# Patient Record
Sex: MALE | Race: WHITE | NOT HISPANIC OR LATINO | Employment: OTHER | URBAN - METROPOLITAN AREA
[De-identification: names, ages, dates, MRNs, and addresses within clinical notes are randomized per-mention and may not be internally consistent; named-entity substitution may affect disease eponyms.]

---

## 2023-08-04 LAB
AMPHETAMINE (PRESENCE) IN URINE BY SCREEN METHOD: ABNORMAL
BARBITURATES PRESENCE IN URINE BY SCREEN METHOD: ABNORMAL
BENZODIAZEPINE (PRESENCE) IN URINE BY SCREEN METHOD: ABNORMAL
CANNABINOIDS IN URINE BY SCREEN METHOD: ABNORMAL
COCAINE (PRESENCE) IN URINE BY SCREEN METHOD: ABNORMAL
DRUG SCREEN COMMENT URINE: ABNORMAL
FENTANYL URINE: ABNORMAL
METHADONE (PRESENCE) IN URINE BY SCREEN METHOD: ABNORMAL
OPIATES (PRESENCE) IN URINE BY SCREEN METHOD: ABNORMAL
OXYCODONE (PRESENCE) IN URINE BY SCREEN METHOD: ABNORMAL
PHENCYCLIDINE (PRESENCE) IN URINE BY SCREEN METHOD: ABNORMAL

## 2023-08-09 LAB — 11-NOR-9-CARBOXY-THC, URN, QUANT: 43 NG/ML

## 2023-08-10 LAB
7-AMINOCLONAZEPAM: <25 NG/ML
ALPHA-HYDROXYALPRAZOLAM: >1000 NG/ML
ALPHA-HYDROXYMIDAZOLAM: <25 NG/ML
ALPRAZOLAM: 418 NG/ML
CHLORDIAZEPOXIDE: <25 NG/ML
CLONAZEPAM: <25 NG/ML
DIAZEPAM: <25 NG/ML
LORAZEPAM: <25 NG/ML
MIDAZOLAM: <25 NG/ML
NORDIAZEPAM: <25 NG/ML
OXAZEPAM: <25 NG/ML
TEMAZEPAM: <25 NG/ML

## 2023-08-16 LAB — URINE CULTURE: NO GROWTH

## 2023-11-15 DIAGNOSIS — I10 HYPERTENSION, UNSPECIFIED TYPE: ICD-10-CM

## 2023-11-15 NOTE — TELEPHONE ENCOUNTER
Rx Refill Request Telephone Encounter    Name:  Soren Herrera  :  [id number]  Medication Name:  LISINOPRIL 10 MG  Specific Pharmacy location:  [address] [phone]   Date of last appointment:  8/15/23 ([facility]IA)  Date of next appointment:  24  Best number to reach patient:   [phone]

## 2023-11-20 RX ORDER — LISINOPRIL AND HYDROCHLOROTHIAZIDE 10; 12.5 MG/1; MG/1
1 TABLET ORAL DAILY
COMMUNITY
End: 2023-11-20 | Stop reason: SDUPTHER

## 2023-11-20 RX ORDER — LISINOPRIL AND HYDROCHLOROTHIAZIDE 10; 12.5 MG/1; MG/1
1 TABLET ORAL DAILY
Qty: 90 TABLET | Refills: 0 | Status: SHIPPED | OUTPATIENT
Start: 2023-11-20 | End: 2024-01-03 | Stop reason: SDUPTHER

## 2023-12-19 ENCOUNTER — CLINICAL SUPPORT (OUTPATIENT)
Dept: PRIMARY CARE | Facility: CLINIC | Age: 78
End: 2023-12-19
Payer: MEDICARE

## 2023-12-19 DIAGNOSIS — Z23 NEED FOR INFLUENZA VACCINATION: ICD-10-CM

## 2023-12-19 PROCEDURE — 90662 IIV NO PRSV INCREASED AG IM: CPT | Performed by: FAMILY MEDICINE

## 2023-12-19 PROCEDURE — G0008 ADMIN INFLUENZA VIRUS VAC: HCPCS | Performed by: FAMILY MEDICINE

## 2024-01-03 ENCOUNTER — OFFICE VISIT (OUTPATIENT)
Dept: PRIMARY CARE | Facility: CLINIC | Age: 79
End: 2024-01-03
Payer: MEDICARE

## 2024-01-03 VITALS
BODY MASS INDEX: 25.77 KG/M2 | DIASTOLIC BLOOD PRESSURE: 78 MMHG | HEIGHT: 69 IN | SYSTOLIC BLOOD PRESSURE: 113 MMHG | WEIGHT: 174 LBS | TEMPERATURE: 97.3 F | HEART RATE: 93 BPM

## 2024-01-03 DIAGNOSIS — G47.00 INSOMNIA, UNSPECIFIED TYPE: ICD-10-CM

## 2024-01-03 DIAGNOSIS — L85.3 DRY SKIN DERMATITIS: ICD-10-CM

## 2024-01-03 DIAGNOSIS — Z00.00 WELL ADULT HEALTH CHECK: ICD-10-CM

## 2024-01-03 DIAGNOSIS — I10 HYPERTENSION, UNSPECIFIED TYPE: ICD-10-CM

## 2024-01-03 DIAGNOSIS — I70.90 ATHEROSCLEROSIS: ICD-10-CM

## 2024-01-03 DIAGNOSIS — E78.5 HYPERLIPIDEMIA, UNSPECIFIED HYPERLIPIDEMIA TYPE: ICD-10-CM

## 2024-01-03 DIAGNOSIS — I10 BENIGN ESSENTIAL HTN: Primary | Chronic | ICD-10-CM

## 2024-01-03 PROBLEM — F40.243 ANXIETY WITH FLYING: Status: ACTIVE | Noted: 2024-01-03

## 2024-01-03 PROBLEM — H25.813 COMBINED FORM OF SENILE CATARACT OF BOTH EYES: Status: RESOLVED | Noted: 2024-01-03 | Resolved: 2024-01-03

## 2024-01-03 PROBLEM — I49.3 PVC (PREMATURE VENTRICULAR CONTRACTION): Status: RESOLVED | Noted: 2024-01-03 | Resolved: 2024-01-03

## 2024-01-03 PROBLEM — Z96.649 HISTORY OF TOTAL HIP REPLACEMENT: Status: RESOLVED | Noted: 2024-01-03 | Resolved: 2024-01-03

## 2024-01-03 PROCEDURE — 3074F SYST BP LT 130 MM HG: CPT | Performed by: FAMILY MEDICINE

## 2024-01-03 PROCEDURE — 1036F TOBACCO NON-USER: CPT | Performed by: FAMILY MEDICINE

## 2024-01-03 PROCEDURE — 1159F MED LIST DOCD IN RCRD: CPT | Performed by: FAMILY MEDICINE

## 2024-01-03 PROCEDURE — 3078F DIAST BP <80 MM HG: CPT | Performed by: FAMILY MEDICINE

## 2024-01-03 PROCEDURE — 1160F RVW MEDS BY RX/DR IN RCRD: CPT | Performed by: FAMILY MEDICINE

## 2024-01-03 PROCEDURE — 99214 OFFICE O/P EST MOD 30 MIN: CPT | Performed by: FAMILY MEDICINE

## 2024-01-03 RX ORDER — ASPIRIN 81 MG/1
81 TABLET ORAL DAILY
COMMUNITY
Start: 2014-05-19 | End: 2024-03-04 | Stop reason: ALTCHOICE

## 2024-01-03 RX ORDER — CELECOXIB 200 MG/1
200 CAPSULE ORAL DAILY PRN
COMMUNITY
Start: 2023-08-05 | End: 2024-01-03 | Stop reason: ALTCHOICE

## 2024-01-03 RX ORDER — UBIDECARENONE 30 MG
1 CAPSULE ORAL DAILY
COMMUNITY

## 2024-01-03 RX ORDER — RAMELTEON 8 MG/1
8 TABLET ORAL NIGHTLY
Qty: 90 TABLET | Refills: 0 | Status: SHIPPED | OUTPATIENT
Start: 2024-01-03 | End: 2024-01-08

## 2024-01-03 RX ORDER — LISINOPRIL AND HYDROCHLOROTHIAZIDE 10; 12.5 MG/1; MG/1
1 TABLET ORAL DAILY
Qty: 90 TABLET | Refills: 1 | Status: SHIPPED | OUTPATIENT
Start: 2024-01-03 | End: 2024-03-04 | Stop reason: SDUPTHER

## 2024-01-03 RX ORDER — CHOLECALCIFEROL (VITAMIN D3) 50 MCG
1 TABLET ORAL DAILY
COMMUNITY
End: 2024-03-04 | Stop reason: ALTCHOICE

## 2024-01-03 RX ORDER — ROSUVASTATIN CALCIUM 10 MG/1
10 TABLET, COATED ORAL DAILY
COMMUNITY
Start: 2023-01-30 | End: 2024-01-03 | Stop reason: SDUPTHER

## 2024-01-03 RX ORDER — ROSUVASTATIN CALCIUM 10 MG/1
10 TABLET, COATED ORAL DAILY
Qty: 90 TABLET | Refills: 1 | Status: SHIPPED | OUTPATIENT
Start: 2024-01-03 | End: 2024-07-01

## 2024-01-03 RX ORDER — CLOBETASOL PROPIONATE 0.46 MG/ML
SOLUTION TOPICAL
Qty: 60 ML | Refills: 1 | Status: SHIPPED | OUTPATIENT
Start: 2024-01-03 | End: 2024-03-04 | Stop reason: WASHOUT

## 2024-01-03 ASSESSMENT — PATIENT HEALTH QUESTIONNAIRE - PHQ9
2. FEELING DOWN, DEPRESSED OR HOPELESS: NOT AT ALL
1. LITTLE INTEREST OR PLEASURE IN DOING THINGS: NOT AT ALL
SUM OF ALL RESPONSES TO PHQ9 QUESTIONS 1 AND 2: 0

## 2024-01-06 ASSESSMENT — ENCOUNTER SYMPTOMS
PALPITATIONS: 0
VOMITING: 0
DIARRHEA: 0
FATIGUE: 1
HEADACHES: 0
UNEXPECTED WEIGHT CHANGE: 0
CHEST TIGHTNESS: 0
CONSTIPATION: 0
SHORTNESS OF BREATH: 0
ABDOMINAL PAIN: 0
CHILLS: 0
NAUSEA: 0
APPETITE CHANGE: 0
FEVER: 0
DIZZINESS: 0
COUGH: 0

## 2024-01-06 NOTE — PROGRESS NOTES
"Subjective   Patient ID: Soren Herrera is a 78 y.o. male who presents for Follow-up (Patient wants to discuss a topical cream for a rash. ).    HPI   Here for follow-up  1.  Hypertension-stable on lisinopril-HCTZ.  Infrequent home measurements.  Denies headaches, lightheadedness, dizziness  2.  Hyperlipidemia-stable on statin therapy.  Denies chest pains, palpitations, dyspnea on exertion  3.  Skin dermatitis-severe itching neck and upper back, flares typically each winter.  Patient feels scaly lesions and then picks and scratches at lesion.  Previous good response to clobetasol mixed with CeraVe.  4.  Insomnia-continues to have chronic issues with Sleep onset and maintenance.  Frequent wakening.  Past use of benzodiazepines to achieve sleep.   Review of Systems   Constitutional:  Positive for fatigue. Negative for appetite change, chills, fever and unexpected weight change.   Eyes:  Negative for visual disturbance.   Respiratory:  Negative for cough, chest tightness and shortness of breath.    Cardiovascular:  Negative for chest pain and palpitations.   Gastrointestinal:  Negative for abdominal pain, constipation, diarrhea, nausea and vomiting.   Neurological:  Negative for dizziness and headaches.       Objective   /78 (BP Location: Left arm, Patient Position: Sitting)   Pulse 93   Temp 36.3 °C (97.3 °F)   Ht 1.753 m (5' 9\")   Wt 78.9 kg (174 lb)   BMI 25.70 kg/m²     Physical Exam  Vitals reviewed.   Constitutional:       General: He is not in acute distress.     Appearance: Normal appearance. He is normal weight.   HENT:      Right Ear: Tympanic membrane and ear canal normal.      Left Ear: Tympanic membrane and ear canal normal.      Nose: Nose normal. No congestion or rhinorrhea.      Mouth/Throat:      Mouth: Mucous membranes are moist.      Pharynx: Oropharynx is clear.   Eyes:      Extraocular Movements: Extraocular movements intact.      Conjunctiva/sclera: Conjunctivae normal.      Pupils: " Pupils are equal, round, and reactive to light.   Cardiovascular:      Rate and Rhythm: Normal rate and regular rhythm.      Pulses: Normal pulses.      Heart sounds: Normal heart sounds. No murmur heard.  Pulmonary:      Effort: Pulmonary effort is normal. No respiratory distress.      Breath sounds: Normal breath sounds.   Abdominal:      General: Abdomen is flat. Bowel sounds are normal.      Palpations: Abdomen is soft.      Tenderness: There is no abdominal tenderness.   Musculoskeletal:         General: Normal range of motion.      Cervical back: Normal range of motion and neck supple.   Lymphadenopathy:      Cervical: No cervical adenopathy.   Skin:     General: Skin is warm and dry.      Findings: Rash present.      Comments: Mild erythema with scaling across upper back, evidence of excoriation and well-healed scars consistent with traumatic excoriation in the past.   Neurological:      General: No focal deficit present.      Mental Status: He is alert and oriented to person, place, and time.   Psychiatric:         Mood and Affect: Mood normal.         Thought Content: Thought content normal.         Assessment/Plan   Diagnoses and all orders for this visit:  Benign essential HTN  -     lisinopriL-hydrochlorothiazide 10-12.5 mg tablet; Take 1 tablet by mouth once daily.  -     CBC and Auto Differential; Future  -     Comprehensive Metabolic Panel; Future  -     Albumin , Urine Random; Future  Hypertension, unspecified type  Atherosclerosis  -     rosuvastatin (Crestor) 10 mg tablet; Take 1 tablet (10 mg) by mouth once daily.  -     Lipid Panel; Future  Hyperlipidemia, unspecified hyperlipidemia type  -     rosuvastatin (Crestor) 10 mg tablet; Take 1 tablet (10 mg) by mouth once daily.  -     Lipid Panel; Future  -     TSH with reflex to Free T4 if abnormal; Future  Dry skin dermatitis  -     clobetasol (Temovate) 0.05 % external solution; Mix contents in large tub of cerave and apply bid  Well adult health  check  -     Comprehensive Metabolic Panel; Future  Insomnia, unspecified type  Trial of Rozerem to help with chronic insomnia.-     ramelteon (Rozerem) 8 mg tablet; Take 1 tablet (8 mg) by mouth once daily at bedtime.  Other orders  -     Follow Up In Primary Care - Established; Future

## 2024-01-08 ENCOUNTER — TELEPHONE (OUTPATIENT)
Dept: PRIMARY CARE | Facility: CLINIC | Age: 79
End: 2024-01-08

## 2024-01-08 DIAGNOSIS — G47.00 INSOMNIA, UNSPECIFIED TYPE: Primary | ICD-10-CM

## 2024-01-08 RX ORDER — DOXEPIN HYDROCHLORIDE 10 MG/ML
3 SOLUTION ORAL NIGHTLY
Qty: 118 ML | Refills: 0 | Status: SHIPPED | OUTPATIENT
Start: 2024-01-08 | End: 2024-02-13 | Stop reason: SDUPTHER

## 2024-01-08 NOTE — TELEPHONE ENCOUNTER
Patient stated that the sleep medication that was prescribed at [facility] is $300 and would like to know if there is an alternate that can be given that is not expensive.  Please advise.

## 2024-01-08 NOTE — TELEPHONE ENCOUNTER
He can try a small dose of doxepin.  However, schedule I month follow-up for reassessment.  Call and stop taking medication if he notes any change in mood symptoms, heart palpitations, urinary retention, dizziness.  May contribute to dry mouth or constipation.  EKG from August 2023 reviewed.

## 2024-01-12 ENCOUNTER — APPOINTMENT (OUTPATIENT)
Dept: PRIMARY CARE | Facility: CLINIC | Age: 79
End: 2024-01-12
Payer: MEDICARE

## 2024-02-07 ENCOUNTER — TELEPHONE (OUTPATIENT)
Dept: CARDIOLOGY | Facility: CLINIC | Age: 79
End: 2024-02-07
Payer: MEDICARE

## 2024-02-07 ENCOUNTER — TELEPHONE (OUTPATIENT)
Dept: PRIMARY CARE | Facility: CLINIC | Age: 79
End: 2024-02-07
Payer: MEDICARE

## 2024-02-07 DIAGNOSIS — G47.00 INSOMNIA, UNSPECIFIED TYPE: ICD-10-CM

## 2024-02-07 NOTE — TELEPHONE ENCOUNTER
Patient ran out of Ramelteon and would like to know if he can now try the less expensive medication, Doxepin.

## 2024-02-07 NOTE — TELEPHONE ENCOUNTER
T/c to pt, left message for pt, medication sent over. Advised pt to double check with his pharmacy, if he has any issues to give us a call back.

## 2024-02-07 NOTE — TELEPHONE ENCOUNTER
Patient started ramelteon (Rozerem) 8 mg tablet   last month but that is too expensive. He said you mentioned a cheaper option in its place.   Yon is asking for the replacement script to be sent to     [id number] [address]  Phone: [phone]  Fax: [phone]      Please and thanks

## 2024-02-07 NOTE — TELEPHONE ENCOUNTER
T/c to pt, spoke to pt, pt states he took 1 month of the Ramelteon.   Pt states he would like to try the Doxepin instead.   Please send to [facility] in [place]

## 2024-02-13 RX ORDER — DOXEPIN HYDROCHLORIDE 10 MG/ML
3 SOLUTION ORAL NIGHTLY
Qty: 118 ML | Refills: 0 | Status: SHIPPED | OUTPATIENT
Start: 2024-02-13 | End: 2024-03-04

## 2024-02-13 NOTE — TELEPHONE ENCOUNTER
See note from 2/7/24  Doxepin already sent to pharm last month  He needs to call pharmacy and pickup

## 2024-02-13 NOTE — TELEPHONE ENCOUNTER
T/c to [facility], spoke with the pharmacist, he states they never received an RX for Doxepin. The RX will need to be resent.

## 2024-02-15 DIAGNOSIS — Z96.641 S/P TOTAL RIGHT HIP ARTHROPLASTY: Primary | ICD-10-CM

## 2024-02-16 ENCOUNTER — OFFICE VISIT (OUTPATIENT)
Dept: ORTHOPEDIC SURGERY | Facility: CLINIC | Age: 79
End: 2024-02-16
Payer: MEDICARE

## 2024-02-16 ENCOUNTER — HOSPITAL ENCOUNTER (OUTPATIENT)
Dept: RADIOLOGY | Facility: CLINIC | Age: 79
Discharge: HOME | End: 2024-02-16
Payer: MEDICARE

## 2024-02-16 DIAGNOSIS — Z96.641 S/P TOTAL RIGHT HIP ARTHROPLASTY: Primary | ICD-10-CM

## 2024-02-16 DIAGNOSIS — Z96.641 S/P TOTAL RIGHT HIP ARTHROPLASTY: ICD-10-CM

## 2024-02-16 PROCEDURE — 1159F MED LIST DOCD IN RCRD: CPT | Performed by: ORTHOPAEDIC SURGERY

## 2024-02-16 PROCEDURE — 73502 X-RAY EXAM HIP UNI 2-3 VIEWS: CPT | Mod: RIGHT SIDE | Performed by: RADIOLOGY

## 2024-02-16 PROCEDURE — 73502 X-RAY EXAM HIP UNI 2-3 VIEWS: CPT | Mod: RT

## 2024-02-16 PROCEDURE — 1036F TOBACCO NON-USER: CPT | Performed by: ORTHOPAEDIC SURGERY

## 2024-02-16 PROCEDURE — 1160F RVW MEDS BY RX/DR IN RCRD: CPT | Performed by: ORTHOPAEDIC SURGERY

## 2024-02-16 PROCEDURE — 99213 OFFICE O/P EST LOW 20 MIN: CPT | Performed by: ORTHOPAEDIC SURGERY

## 2024-02-16 RX ORDER — CLINDAMYCIN HYDROCHLORIDE 300 MG/1
CAPSULE ORAL
Qty: 2 CAPSULE | Refills: 2 | Status: SHIPPED | OUTPATIENT
Start: 2024-02-16

## 2024-02-16 ASSESSMENT — PAIN - FUNCTIONAL ASSESSMENT: PAIN_FUNCTIONAL_ASSESSMENT: NO/DENIES PAIN

## 2024-02-16 NOTE — PROGRESS NOTES
No chief complaint on file.      The patient is here for follow-up of their side: right hip arthroplasty.  The patient has mild hip pain.  The patient has no mechanical symptoms.  The patient is approximately  2 years  postop.  He notes a recent fall about 3 weeks ago where he injured his back and states he fell onto his right side and hip but does not have any pain in the hip itself    Physical examination:    Examination of the side: right hip  The incision is healing well  No erythema or warmth.  Range of motion: Forward Flexion: 120 Internal Rotation: 30 External Rotation: 30 Abduction 30  The Patient can single leg stand and actively abduct  Calf is soft, Homans negative  The patient has intact ankle dorsiflexion and plantarflexion.    Radiographs:  Right hip films show a total hip arthroplasty in satisfactory position.  The hip is reduced.  No fracture is identified.  No obvious loosening or wear is appreciated.  There is some heterotopic bone seen in the hip abductors.        Impression:  Status post side: right total hip arthroplasty    Plan:  Clindamycin script sent in for future dental antibiotic prophylaxis  Follow up in 1 year with XR   All questions answered

## 2024-03-04 ENCOUNTER — OFFICE VISIT (OUTPATIENT)
Dept: PRIMARY CARE | Facility: CLINIC | Age: 79
End: 2024-03-04
Payer: MEDICARE

## 2024-03-04 VITALS
DIASTOLIC BLOOD PRESSURE: 66 MMHG | SYSTOLIC BLOOD PRESSURE: 98 MMHG | HEIGHT: 69 IN | HEART RATE: 108 BPM | TEMPERATURE: 97.5 F | WEIGHT: 181.4 LBS | BODY MASS INDEX: 26.87 KG/M2

## 2024-03-04 DIAGNOSIS — Z00.00 ROUTINE GENERAL MEDICAL EXAMINATION AT HEALTH CARE FACILITY: Primary | ICD-10-CM

## 2024-03-04 DIAGNOSIS — Z71.89 ADVANCE DIRECTIVE DISCUSSED WITH PATIENT: ICD-10-CM

## 2024-03-04 DIAGNOSIS — F51.01 PRIMARY INSOMNIA: ICD-10-CM

## 2024-03-04 DIAGNOSIS — Z71.89 ENCOUNTER FOR CARDIAC RISK COUNSELING: ICD-10-CM

## 2024-03-04 DIAGNOSIS — Z11.59 NEED FOR HEPATITIS C SCREENING TEST: ICD-10-CM

## 2024-03-04 DIAGNOSIS — Z13.89 SCREENING FOR MULTIPLE CONDITIONS: ICD-10-CM

## 2024-03-04 DIAGNOSIS — I10 BENIGN ESSENTIAL HTN: Chronic | ICD-10-CM

## 2024-03-04 PROBLEM — K22.0 ACHALASIA OF ESOPHAGUS: Status: ACTIVE | Noted: 2024-03-04

## 2024-03-04 PROCEDURE — 99213 OFFICE O/P EST LOW 20 MIN: CPT | Performed by: FAMILY MEDICINE

## 2024-03-04 PROCEDURE — G0439 PPPS, SUBSEQ VISIT: HCPCS | Performed by: FAMILY MEDICINE

## 2024-03-04 PROCEDURE — 1160F RVW MEDS BY RX/DR IN RCRD: CPT | Performed by: FAMILY MEDICINE

## 2024-03-04 PROCEDURE — 3078F DIAST BP <80 MM HG: CPT | Performed by: FAMILY MEDICINE

## 2024-03-04 PROCEDURE — G0446 INTENS BEHAVE THER CARDIO DX: HCPCS | Performed by: FAMILY MEDICINE

## 2024-03-04 PROCEDURE — 3074F SYST BP LT 130 MM HG: CPT | Performed by: FAMILY MEDICINE

## 2024-03-04 PROCEDURE — 99397 PER PM REEVAL EST PAT 65+ YR: CPT | Performed by: FAMILY MEDICINE

## 2024-03-04 PROCEDURE — 99497 ADVNCD CARE PLAN 30 MIN: CPT | Performed by: FAMILY MEDICINE

## 2024-03-04 PROCEDURE — 1036F TOBACCO NON-USER: CPT | Performed by: FAMILY MEDICINE

## 2024-03-04 PROCEDURE — 1170F FXNL STATUS ASSESSED: CPT | Performed by: FAMILY MEDICINE

## 2024-03-04 PROCEDURE — 1123F ACP DISCUSS/DSCN MKR DOCD: CPT | Performed by: FAMILY MEDICINE

## 2024-03-04 PROCEDURE — G0444 DEPRESSION SCREEN ANNUAL: HCPCS | Performed by: FAMILY MEDICINE

## 2024-03-04 PROCEDURE — 1159F MED LIST DOCD IN RCRD: CPT | Performed by: FAMILY MEDICINE

## 2024-03-04 RX ORDER — LISINOPRIL AND HYDROCHLOROTHIAZIDE 10; 12.5 MG/1; MG/1
1 TABLET ORAL DAILY
Qty: 90 TABLET | Refills: 1 | Status: SHIPPED | OUTPATIENT
Start: 2024-03-04 | End: 2024-08-31

## 2024-03-04 RX ORDER — RAMELTEON 8 MG/1
8 TABLET ORAL NIGHTLY
Qty: 90 TABLET | Refills: 1 | Status: SHIPPED | OUTPATIENT
Start: 2024-03-04 | End: 2025-03-04

## 2024-03-04 ASSESSMENT — ACTIVITIES OF DAILY LIVING (ADL)
DOING_HOUSEWORK: INDEPENDENT
DRESSING: INDEPENDENT
BATHING: INDEPENDENT
MANAGING_FINANCES: INDEPENDENT
GROCERY_SHOPPING: INDEPENDENT
TAKING_MEDICATION: INDEPENDENT

## 2024-03-04 ASSESSMENT — PATIENT HEALTH QUESTIONNAIRE - PHQ9
2. FEELING DOWN, DEPRESSED OR HOPELESS: NOT AT ALL
SUM OF ALL RESPONSES TO PHQ9 QUESTIONS 1 AND 2: 0
1. LITTLE INTEREST OR PLEASURE IN DOING THINGS: NOT AT ALL

## 2024-03-04 NOTE — PATIENT INSTRUCTIONS

## 2024-03-04 NOTE — PROGRESS NOTES
"Subjective   Reason for Visit: Soren Herrera is an 78 y.o. male here for a Medicare Wellness visit.          Reviewed all medications by prescribing practitioner or clinical pharmacist (such as prescriptions, OTCs, herbal therapies and supplements) and documented in the medical record.    HPI    Patient Care Team:  Tete Arguello MD as PCP - General  Tete Arguello MD as PCP - [employer] PCP     Review of Systems    Objective   Vitals:  BP 98/66 (BP Location: Left arm, Patient Position: Sitting, BP Cuff Size: Large adult)   Pulse 108   Temp 36.4 °C (97.5 °F)   Ht 1.753 m (5' 9\")   Wt 82.3 kg (181 lb 6.4 oz)   BMI 26.79 kg/m²       Physical Exam    Assessment/Plan   Problem List Items Addressed This Visit       Benign essential HTN (Chronic)        Patient was identified as a fall risk. Risk prevention instructions provided.  "

## 2024-03-04 NOTE — PROGRESS NOTES
"Subjective   Reason for Visit: Soren Herrera is an 78 y.o. male here for a Medicare Wellness visit.     Past Medical, Surgical, and Family History reviewed and updated in chart.         HPI  Here for Medicare wellness visit as well as medications follow-up.  1.  Insomnia-has been taking doxepin 3 mg at bedtime, but no improvement in sleep onset or sleep maintenance.  Did have good response to Rozerem, but discontinued due to high out-of-pocket expense.  2.  Hypertension-compliant with regular dosing of lisinopril-HCTZ.  Blood pressure measurement today is lower than typical.  He denies headaches, lightheadedness, dizziness outside of the office.    Patient Care Team:  Tete Arguello MD as PCP - General  Tete Arguello MD as PCP - [employer] PCP     Review of Systems   Constitutional:  Negative for appetite change, fever and unexpected weight change.   HENT:  Negative for hearing loss and tinnitus.    Eyes:  Negative for visual disturbance.   Respiratory:  Negative for cough, chest tightness and shortness of breath.    Cardiovascular:  Negative for chest pain, palpitations and leg swelling.   Gastrointestinal:  Negative for abdominal pain, blood in stool, constipation and diarrhea.   Genitourinary:  Negative for difficulty urinating, frequency and urgency.   Musculoskeletal:  Positive for arthralgias.   Neurological:  Negative for headaches.   Psychiatric/Behavioral:  Positive for sleep disturbance. Negative for dysphoric mood and suicidal ideas. The patient is not nervous/anxious.        Objective   Vitals:  BP 98/66 (BP Location: Left arm, Patient Position: Sitting, BP Cuff Size: Large adult)   Pulse 108   Temp 36.4 °C (97.5 °F)   Ht 1.753 m (5' 9\")   Wt 82.3 kg (181 lb 6.4 oz)   BMI 26.79 kg/m²       Physical Exam  Vitals reviewed.   Constitutional:       General: He is not in acute distress.     Appearance: Normal appearance.   HENT:      Head: Normocephalic and atraumatic.      Right " Ear: Tympanic membrane and ear canal normal.      Left Ear: Tympanic membrane and ear canal normal.      Nose: Nose normal. No congestion or rhinorrhea.      Mouth/Throat:      Mouth: Mucous membranes are moist.      Pharynx: Oropharynx is clear.   Eyes:      Extraocular Movements: Extraocular movements intact.      Conjunctiva/sclera: Conjunctivae normal.      Pupils: Pupils are equal, round, and reactive to light.   Cardiovascular:      Rate and Rhythm: Normal rate and regular rhythm.      Pulses: Normal pulses.      Heart sounds: Normal heart sounds. No murmur heard.  Pulmonary:      Effort: Pulmonary effort is normal. No respiratory distress.      Breath sounds: Normal breath sounds.   Abdominal:      General: Abdomen is flat. Bowel sounds are normal.      Palpations: Abdomen is soft.      Tenderness: There is no abdominal tenderness.   Musculoskeletal:         General: Normal range of motion.      Cervical back: Normal range of motion and neck supple.   Lymphadenopathy:      Cervical: No cervical adenopathy.   Skin:     General: Skin is warm and dry.   Neurological:      General: No focal deficit present.      Mental Status: He is alert and oriented to person, place, and time. Mental status is at baseline.      Cranial Nerves: No cranial nerve deficit.   Psychiatric:         Mood and Affect: Mood normal.         Thought Content: Thought content normal.         Assessment/Plan   Problem List Items Addressed This Visit       Essential hypertension    Advised patient to monitor BP at home.  Systolic pressures consistently less than 110, consider discontinuation of HCTZ component of BP medication.  Relevant Medications    lisinopriL-hydrochlorothiazide 10-12.5 mg tablet    Insomnia    [employer] price for Rozerem reviewed with patient.  He feels that this discounted price is manageable and he would like to retry Rozerem.  New Rx prescribed  Relevant Medications    ramelteon (Rozerem) 8 mg tablet     Other Visit  Diagnoses       Routine general medical examination at health care facility    -  Primary    Colon Cancer screening up-to-date-Cologuard due 2025.  Need for hepatitis C screening test        Relevant Orders    Hepatitis C antibody    Screening for multiple conditions      Depression screening completed using the PHQ-2 questions with results documented in the chart/encounter and current risk for depression is low.  (See Rooming Screenings for documentation)  Results were reviewed and discussed with Soren Herrera.      Encounter for cardiac risk counseling      Cardiac Risk Assessment  Cardiovascular risk was discussed .   ASCVD 10 year risk score: 19.4  Lifestyle modifications recommended, including nutritional choices, exercise, and elimination of habits contributing to risk.   We agreed on a plan to reduce the current cardiovascular risk based on above discussion as needed.    Aspirin use/disuse was discussed and advised to continue as tolerating well at present.  Proceed with lipds-may need to intensify statin therapy based on results.       Advance directive discussed with patient       Discussion Date: 3/4/24   Discussion Participants: Patient  Advance Care Planning was discussed today and the following is a brief summary of our discussion.   Patient has capacity to make their own medical decisions: Yes  Health Care Agent/Surrogate Decision Maker documented in chart: Yes,   (See Problem List: Advance Directive Discussion for details)    Documents on file and valid:  Advance Directive/Living Will: No   Health Care Power of : No  We discussed completion of these documents and patient packet provided    Communication of Medical Status/Prognosis, Treatment Goals/Options, Treatment Decisions: Yes  Goals of Care: quality of life is prioritized; willing to accept low-burden treatments to achieve meaningful goals   Follow Up Plan: Annual Discussion  Time Statement: Total face to face time spent counseling,  including the explanation on advance care planning, was 16 minutes.    Tete Arguello MD

## 2024-03-10 ASSESSMENT — ENCOUNTER SYMPTOMS
DIARRHEA: 0
HEADACHES: 0
FEVER: 0
CHEST TIGHTNESS: 0
DYSPHORIC MOOD: 0
DIFFICULTY URINATING: 0
COUGH: 0
ABDOMINAL PAIN: 0
ARTHRALGIAS: 1
FREQUENCY: 0
PALPITATIONS: 0
SLEEP DISTURBANCE: 1
UNEXPECTED WEIGHT CHANGE: 0
APPETITE CHANGE: 0
SHORTNESS OF BREATH: 0
BLOOD IN STOOL: 0
NERVOUS/ANXIOUS: 0
CONSTIPATION: 0

## 2024-04-03 ENCOUNTER — APPOINTMENT (OUTPATIENT)
Dept: PRIMARY CARE | Facility: CLINIC | Age: 79
End: 2024-04-03
Payer: MEDICARE

## 2024-11-13 ENCOUNTER — APPOINTMENT (OUTPATIENT)
Dept: PRIMARY CARE | Facility: CLINIC | Age: 79
End: 2024-11-13
Payer: MEDICARE

## 2024-11-25 DIAGNOSIS — E78.5 HYPERLIPIDEMIA, UNSPECIFIED HYPERLIPIDEMIA TYPE: ICD-10-CM

## 2024-11-25 DIAGNOSIS — I10 BENIGN ESSENTIAL HTN: Chronic | ICD-10-CM

## 2024-11-25 DIAGNOSIS — I70.90 ATHEROSCLEROSIS: ICD-10-CM

## 2024-11-25 RX ORDER — LISINOPRIL AND HYDROCHLOROTHIAZIDE 10; 12.5 MG/1; MG/1
1 TABLET ORAL DAILY
Qty: 90 TABLET | Refills: 0 | Status: SHIPPED | OUTPATIENT
Start: 2024-11-25 | End: 2025-05-24

## 2024-11-25 RX ORDER — ROSUVASTATIN CALCIUM 10 MG/1
10 TABLET, COATED ORAL DAILY
Qty: 90 TABLET | Refills: 0 | Status: SHIPPED | OUTPATIENT
Start: 2024-11-25 | End: 2025-05-24

## 2024-12-02 ENCOUNTER — APPOINTMENT (OUTPATIENT)
Dept: PRIMARY CARE | Facility: CLINIC | Age: 79
End: 2024-12-02
Payer: MEDICARE

## 2024-12-04 ENCOUNTER — LAB (OUTPATIENT)
Dept: LAB | Facility: LAB | Age: 79
End: 2024-12-04
Payer: MEDICARE

## 2024-12-04 DIAGNOSIS — I10 BENIGN ESSENTIAL HTN: Chronic | ICD-10-CM

## 2024-12-04 DIAGNOSIS — E78.5 HYPERLIPIDEMIA, UNSPECIFIED HYPERLIPIDEMIA TYPE: ICD-10-CM

## 2024-12-04 DIAGNOSIS — Z11.59 NEED FOR HEPATITIS C SCREENING TEST: ICD-10-CM

## 2024-12-04 DIAGNOSIS — Z00.00 WELL ADULT HEALTH CHECK: ICD-10-CM

## 2024-12-04 DIAGNOSIS — I70.90 ATHEROSCLEROSIS: ICD-10-CM

## 2024-12-04 PROCEDURE — G0472 HEP C SCREEN HIGH RISK/OTHER: HCPCS

## 2024-12-04 PROCEDURE — 82043 UR ALBUMIN QUANTITATIVE: CPT

## 2024-12-04 PROCEDURE — 36415 COLL VENOUS BLD VENIPUNCTURE: CPT

## 2024-12-04 PROCEDURE — 80061 LIPID PANEL: CPT

## 2024-12-04 PROCEDURE — 84443 ASSAY THYROID STIM HORMONE: CPT

## 2024-12-04 PROCEDURE — 82570 ASSAY OF URINE CREATININE: CPT

## 2024-12-04 PROCEDURE — 80053 COMPREHEN METABOLIC PANEL: CPT

## 2024-12-04 PROCEDURE — 85025 COMPLETE CBC W/AUTO DIFF WBC: CPT

## 2024-12-05 ENCOUNTER — TELEPHONE (OUTPATIENT)
Dept: PRIMARY CARE | Facility: CLINIC | Age: 79
End: 2024-12-05
Payer: MEDICARE

## 2024-12-05 LAB
ALBUMIN SERPL BCP-MCNC: 4.1 G/DL (ref 3.4–5)
ALP SERPL-CCNC: 51 U/L (ref 33–136)
ALT SERPL W P-5'-P-CCNC: 23 U/L (ref 10–52)
ANION GAP SERPL CALC-SCNC: 15 MMOL/L (ref 10–20)
AST SERPL W P-5'-P-CCNC: 24 U/L (ref 9–39)
BASOPHILS # BLD AUTO: 0.05 X10*3/UL (ref 0–0.1)
BASOPHILS NFR BLD AUTO: 0.5 %
BILIRUB SERPL-MCNC: 0.7 MG/DL (ref 0–1.2)
BUN SERPL-MCNC: 15 MG/DL (ref 6–23)
CALCIUM SERPL-MCNC: 10.1 MG/DL (ref 8.6–10.6)
CHLORIDE SERPL-SCNC: 97 MMOL/L (ref 98–107)
CHOLEST SERPL-MCNC: 156 MG/DL (ref 0–199)
CHOLESTEROL/HDL RATIO: 2.9
CO2 SERPL-SCNC: 28 MMOL/L (ref 21–32)
CREAT SERPL-MCNC: 0.78 MG/DL (ref 0.5–1.3)
CREAT UR-MCNC: 39.6 MG/DL (ref 20–370)
EGFRCR SERPLBLD CKD-EPI 2021: >90 ML/MIN/1.73M*2
EOSINOPHIL # BLD AUTO: 0.18 X10*3/UL (ref 0–0.4)
EOSINOPHIL NFR BLD AUTO: 1.9 %
ERYTHROCYTE [DISTWIDTH] IN BLOOD BY AUTOMATED COUNT: 12.5 % (ref 11.5–14.5)
GLUCOSE SERPL-MCNC: 111 MG/DL (ref 74–99)
HCT VFR BLD AUTO: 42 % (ref 41–52)
HCV AB SER QL: NONREACTIVE
HDLC SERPL-MCNC: 53 MG/DL
HGB BLD-MCNC: 14.6 G/DL (ref 13.5–17.5)
IMM GRANULOCYTES # BLD AUTO: 0.08 X10*3/UL (ref 0–0.5)
IMM GRANULOCYTES NFR BLD AUTO: 0.9 % (ref 0–0.9)
LDLC SERPL CALC-MCNC: 81 MG/DL
LYMPHOCYTES # BLD AUTO: 2.4 X10*3/UL (ref 0.8–3)
LYMPHOCYTES NFR BLD AUTO: 25.6 %
MCH RBC QN AUTO: 31.1 PG (ref 26–34)
MCHC RBC AUTO-ENTMCNC: 34.8 G/DL (ref 32–36)
MCV RBC AUTO: 90 FL (ref 80–100)
MICROALBUMIN UR-MCNC: <7 MG/L
MICROALBUMIN/CREAT UR: NORMAL MG/G{CREAT}
MONOCYTES # BLD AUTO: 0.7 X10*3/UL (ref 0.05–0.8)
MONOCYTES NFR BLD AUTO: 7.5 %
NEUTROPHILS # BLD AUTO: 5.97 X10*3/UL (ref 1.6–5.5)
NEUTROPHILS NFR BLD AUTO: 63.6 %
NON HDL CHOLESTEROL: 103 MG/DL (ref 0–149)
NRBC BLD-RTO: 0 /100 WBCS (ref 0–0)
PLATELET # BLD AUTO: 437 X10*3/UL (ref 150–450)
POTASSIUM SERPL-SCNC: 3.7 MMOL/L (ref 3.5–5.3)
PROT SERPL-MCNC: 6.9 G/DL (ref 6.4–8.2)
RBC # BLD AUTO: 4.69 X10*6/UL (ref 4.5–5.9)
SODIUM SERPL-SCNC: 136 MMOL/L (ref 136–145)
TRIGL SERPL-MCNC: 109 MG/DL (ref 0–149)
TSH SERPL-ACNC: 1.58 MIU/L (ref 0.44–3.98)
VLDL: 22 MG/DL (ref 0–40)
WBC # BLD AUTO: 9.4 X10*3/UL (ref 4.4–11.3)

## 2024-12-05 NOTE — TELEPHONE ENCOUNTER
----- Message from Tete Arguello sent at 12/5/2024 12:12 PM EST -----  Please keep pending appointment to review lab results.  Overall, results look good

## 2024-12-09 ENCOUNTER — APPOINTMENT (OUTPATIENT)
Dept: PRIMARY CARE | Facility: CLINIC | Age: 79
End: 2024-12-09
Payer: MEDICARE

## 2024-12-09 VITALS
HEIGHT: 69 IN | WEIGHT: 160 LBS | BODY MASS INDEX: 23.7 KG/M2 | HEART RATE: 99 BPM | SYSTOLIC BLOOD PRESSURE: 121 MMHG | TEMPERATURE: 97.6 F | DIASTOLIC BLOOD PRESSURE: 79 MMHG

## 2024-12-09 DIAGNOSIS — I10 ESSENTIAL HYPERTENSION: ICD-10-CM

## 2024-12-09 DIAGNOSIS — R63.4 WEIGHT LOSS, UNINTENTIONAL: Primary | ICD-10-CM

## 2024-12-09 DIAGNOSIS — F51.01 PRIMARY INSOMNIA: ICD-10-CM

## 2024-12-09 DIAGNOSIS — E78.5 HYPERLIPIDEMIA, UNSPECIFIED HYPERLIPIDEMIA TYPE: ICD-10-CM

## 2024-12-09 DIAGNOSIS — K22.0 ACHALASIA OF ESOPHAGUS: ICD-10-CM

## 2024-12-09 DIAGNOSIS — Z23 NEED FOR INFLUENZA VACCINATION: ICD-10-CM

## 2024-12-09 PROCEDURE — 1170F FXNL STATUS ASSESSED: CPT | Performed by: FAMILY MEDICINE

## 2024-12-09 PROCEDURE — 1160F RVW MEDS BY RX/DR IN RCRD: CPT | Performed by: FAMILY MEDICINE

## 2024-12-09 PROCEDURE — 1158F ADVNC CARE PLAN TLK DOCD: CPT | Performed by: FAMILY MEDICINE

## 2024-12-09 PROCEDURE — 90662 IIV NO PRSV INCREASED AG IM: CPT | Performed by: FAMILY MEDICINE

## 2024-12-09 PROCEDURE — 1036F TOBACCO NON-USER: CPT | Performed by: FAMILY MEDICINE

## 2024-12-09 PROCEDURE — G0008 ADMIN INFLUENZA VIRUS VAC: HCPCS | Performed by: FAMILY MEDICINE

## 2024-12-09 PROCEDURE — 99214 OFFICE O/P EST MOD 30 MIN: CPT | Performed by: FAMILY MEDICINE

## 2024-12-09 PROCEDURE — 1123F ACP DISCUSS/DSCN MKR DOCD: CPT | Performed by: FAMILY MEDICINE

## 2024-12-09 PROCEDURE — 3078F DIAST BP <80 MM HG: CPT | Performed by: FAMILY MEDICINE

## 2024-12-09 PROCEDURE — 3074F SYST BP LT 130 MM HG: CPT | Performed by: FAMILY MEDICINE

## 2024-12-09 PROCEDURE — 1159F MED LIST DOCD IN RCRD: CPT | Performed by: FAMILY MEDICINE

## 2024-12-09 RX ORDER — RAMELTEON 8 MG/1
8 TABLET ORAL NIGHTLY
Qty: 90 TABLET | Refills: 1 | Status: SHIPPED | OUTPATIENT
Start: 2024-12-09 | End: 2025-12-09

## 2024-12-09 ASSESSMENT — ENCOUNTER SYMPTOMS
DIARRHEA: 0
UNEXPECTED WEIGHT CHANGE: 1
NAUSEA: 0
CONSTIPATION: 0
LIGHT-HEADEDNESS: 1
APPETITE CHANGE: 1
CHEST TIGHTNESS: 0
FATIGUE: 0
DIZZINESS: 0
VOMITING: 0
SHORTNESS OF BREATH: 0
ROS GI COMMENTS: EARLY SATIETY
ABDOMINAL PAIN: 0

## 2024-12-09 ASSESSMENT — ACTIVITIES OF DAILY LIVING (ADL)
DRESSING: INDEPENDENT
DOING_HOUSEWORK: INDEPENDENT
GROCERY_SHOPPING: INDEPENDENT
MANAGING_FINANCES: INDEPENDENT
TAKING_MEDICATION: INDEPENDENT
BATHING: INDEPENDENT

## 2024-12-09 ASSESSMENT — PATIENT HEALTH QUESTIONNAIRE - PHQ9
1. LITTLE INTEREST OR PLEASURE IN DOING THINGS: NOT AT ALL
2. FEELING DOWN, DEPRESSED OR HOPELESS: NOT AT ALL
SUM OF ALL RESPONSES TO PHQ9 QUESTIONS 1 AND 2: 0

## 2024-12-09 NOTE — ASSESSMENT & PLAN NOTE
Recommend he resume Rozerem as this may help with sleep onset.  Orders:    ramelteon (Rozerem) 8 mg tablet; Take 1 tablet (8 mg) by mouth once daily at bedtime.

## 2024-12-09 NOTE — ASSESSMENT & PLAN NOTE
This may be contributing to some of his decreased appetite/early satiety.  Symptoms persist, may also need follow-up with gastroenterology.

## 2024-12-09 NOTE — PROGRESS NOTES
"Subjective   Reason for Visit: Soren Herrera is an 79 y.o. male here for a Medicare Wellness visit.          Reviewed all medications by prescribing practitioner or clinical pharmacist (such as prescriptions, OTCs, herbal therapies and supplements) and documented in the medical record.    HPI   HTN-stable on lisinopril-HCTZ.  Denies headaches, lightheadedness, dizziness.  Insomnia-difficult to fall asleep; typically remains asleep once he falls asleep.  Has not been taking rozerem recently..  Hyperlipidemia-stable on rosuvastatin.  Tolerating without myalgia  Weight loss-acute illness last month associated with nausea, loss of appetite, dizziness.  During this timeframe, lost 20 #.  In the past few weeks, he has not had additional weight loss, but has not had any return of weight.  Continues to note slightly decreased appetite.  Denies fevers or chills.  No bowel changes.  No nausea or vomiting at present.    Patient Care Team:  Tete Arguello MD as PCP - General  Tete Arguello MD as PCP - [employer] PCP     Review of Systems   Constitutional:  Positive for appetite change and unexpected weight change. Negative for fatigue.   Respiratory:  Negative for chest tightness and shortness of breath.    Cardiovascular:  Negative for chest pain and leg swelling.   Gastrointestinal:  Negative for abdominal pain, constipation, diarrhea, nausea and vomiting.        Early satiety   Neurological:  Positive for light-headedness. Negative for dizziness.       Objective   Vitals:  /79 (BP Location: Left arm, Patient Position: Sitting)   Pulse 99   Temp 36.4 °C (97.6 °F)   Ht 1.753 m (5' 9\")   Wt 72.6 kg (160 lb)   BMI 23.63 kg/m²       Physical Exam  Vitals reviewed.   Constitutional:       General: He is not in acute distress.     Appearance: Normal appearance. He is not ill-appearing.   HENT:      Head: Normocephalic and atraumatic.      Right Ear: Tympanic membrane and ear canal normal.      Left " Ear: Tympanic membrane and ear canal normal.      Nose: Nose normal. No congestion or rhinorrhea.      Mouth/Throat:      Mouth: Mucous membranes are moist.      Pharynx: Oropharynx is clear.   Eyes:      Extraocular Movements: Extraocular movements intact.      Conjunctiva/sclera: Conjunctivae normal.      Pupils: Pupils are equal, round, and reactive to light.   Cardiovascular:      Rate and Rhythm: Normal rate and regular rhythm.      Pulses: Normal pulses.      Heart sounds: Normal heart sounds. No murmur heard.  Pulmonary:      Effort: Pulmonary effort is normal. No respiratory distress.      Breath sounds: Normal breath sounds.   Abdominal:      General: Abdomen is flat. Bowel sounds are normal.      Palpations: Abdomen is soft.      Tenderness: There is no abdominal tenderness.   Musculoskeletal:         General: Normal range of motion.      Cervical back: Normal range of motion and neck supple.      Right lower leg: No edema.      Left lower leg: No edema.   Lymphadenopathy:      Cervical: No cervical adenopathy.   Skin:     General: Skin is warm and dry.      Findings: No bruising or rash.   Neurological:      General: No focal deficit present.      Mental Status: He is alert and oriented to person, place, and time.      Motor: No weakness.      Gait: Gait normal.   Psychiatric:         Mood and Affect: Mood normal.         Thought Content: Thought content normal.         Assessment & Plan  Weight loss, unintentional  As he has not had continued weight loss and overall he is starting to feel better, recommend he try to add calorie dense nutritious foods such as full fat yogurt, avocado, nut butters to diet and monitor for weight gain over the next few weeks.  Try to maintain BMI less than 25.  If continued weight loss or unable to gain weight, we will pursue further evaluation and repeat lab work as ordered.  Orders:    CBC and Auto Differential; Future    Comprehensive metabolic panel; Future    TSH with  reflex to Free T4 if abnormal; Future    Primary insomnia  Recommend he resume Rozerem as this may help with sleep onset.  Orders:    ramelteon (Rozerem) 8 mg tablet; Take 1 tablet (8 mg) by mouth once daily at bedtime.    Hyperlipidemia, unspecified hyperlipidemia type  Continue statin therapy       Essential hypertension  Continue lisinopril       Need for influenza vaccination    Orders:    Flu vaccine, trivalent, preservative free, HIGH-DOSE, age 65y+ (Fluzone)    Achalasia of esophagus  This may be contributing to some of his decreased appetite/early satiety.  Symptoms persist, may also need follow-up with gastroenterology.

## 2025-02-21 ENCOUNTER — OFFICE VISIT (OUTPATIENT)
Dept: PRIMARY CARE | Facility: CLINIC | Age: 80
End: 2025-02-21
Payer: MEDICARE

## 2025-02-21 ENCOUNTER — APPOINTMENT (OUTPATIENT)
Dept: ORTHOPEDIC SURGERY | Facility: CLINIC | Age: 80
End: 2025-02-21
Payer: MEDICARE

## 2025-02-21 VITALS
TEMPERATURE: 97.2 F | DIASTOLIC BLOOD PRESSURE: 90 MMHG | SYSTOLIC BLOOD PRESSURE: 150 MMHG | BODY MASS INDEX: 25.33 KG/M2 | WEIGHT: 171 LBS | HEART RATE: 81 BPM | HEIGHT: 69 IN

## 2025-02-21 DIAGNOSIS — E78.2 MIXED HYPERLIPIDEMIA: ICD-10-CM

## 2025-02-21 DIAGNOSIS — K40.90 RIGHT INGUINAL HERNIA: Primary | ICD-10-CM

## 2025-02-21 DIAGNOSIS — I10 ESSENTIAL HYPERTENSION: ICD-10-CM

## 2025-02-21 PROCEDURE — 1159F MED LIST DOCD IN RCRD: CPT | Performed by: FAMILY MEDICINE

## 2025-02-21 PROCEDURE — 3080F DIAST BP >= 90 MM HG: CPT | Performed by: FAMILY MEDICINE

## 2025-02-21 PROCEDURE — 99214 OFFICE O/P EST MOD 30 MIN: CPT | Performed by: FAMILY MEDICINE

## 2025-02-21 PROCEDURE — G2211 COMPLEX E/M VISIT ADD ON: HCPCS | Performed by: FAMILY MEDICINE

## 2025-02-21 PROCEDURE — 3077F SYST BP >= 140 MM HG: CPT | Performed by: FAMILY MEDICINE

## 2025-02-21 PROCEDURE — 1036F TOBACCO NON-USER: CPT | Performed by: FAMILY MEDICINE

## 2025-02-21 PROCEDURE — 1123F ACP DISCUSS/DSCN MKR DOCD: CPT | Performed by: FAMILY MEDICINE

## 2025-02-21 RX ORDER — LISINOPRIL 10 MG/1
10 TABLET ORAL DAILY
Qty: 90 TABLET | Refills: 0 | Status: SHIPPED | OUTPATIENT
Start: 2025-02-21 | End: 2026-03-28

## 2025-02-21 ASSESSMENT — ENCOUNTER SYMPTOMS
PALPITATIONS: 0
NAUSEA: 0
DIARRHEA: 0
HEADACHES: 0
CONSTIPATION: 0
DIZZINESS: 0
ABDOMINAL PAIN: 1
CHEST TIGHTNESS: 0
DYSURIA: 0
VOMITING: 0
FEVER: 0
HEMATURIA: 0

## 2025-02-21 NOTE — PROGRESS NOTES
"Subjective   Patient ID: Soren Herrera is a 79 y.o. male who presents for Abdominal Pain (C/o right sided lower abdominal tenderness and  bloated, says feels like a hernia. ).    Abdominal Pain  Pertinent negatives include no constipation, diarrhea, dysuria, fever, headaches, hematuria, nausea or vomiting.      Onset 3 weeks ago, intermittent sharp twinge of pain in right groin, subsequently noticed a bulge, it was able to reduce.  No recent change in activity.  No pain with bowel movements or urination.      HTN-Elevated readings over past month. -190, DBP .  No dietary changes.  No NSAID use.    Review of Systems   Constitutional:  Negative for fever.   Respiratory:  Negative for chest tightness.    Cardiovascular:  Negative for chest pain and palpitations.   Gastrointestinal:  Positive for abdominal pain. Negative for constipation, diarrhea, nausea and vomiting.   Genitourinary:  Negative for dysuria and hematuria.   Neurological:  Negative for dizziness and headaches.       Objective   /90 (BP Location: Right arm, Patient Position: Sitting)   Pulse 81   Temp 36.2 °C (97.2 °F)   Ht 1.753 m (5' 9\")   Wt 77.6 kg (171 lb)   BMI 25.25 kg/m²     Physical Exam  Vitals reviewed.   Constitutional:       Appearance: Normal appearance. He is normal weight.   HENT:      Head: Normocephalic and atraumatic.      Mouth/Throat:      Mouth: Mucous membranes are moist.   Eyes:      Extraocular Movements: Extraocular movements intact.      Conjunctiva/sclera: Conjunctivae normal.   Cardiovascular:      Rate and Rhythm: Normal rate and regular rhythm.      Pulses: Normal pulses.      Heart sounds: Normal heart sounds. No murmur heard.  Pulmonary:      Effort: Pulmonary effort is normal. No respiratory distress.      Breath sounds: Normal breath sounds.   Abdominal:      General: Bowel sounds are normal.      Palpations: Abdomen is soft.      Tenderness: There is abdominal tenderness in the right lower " quadrant.      Hernia: A hernia is present. Hernia is present in the right inguinal area.      Comments: Right inguinal hernia easily reduced.   Musculoskeletal:      Cervical back: Neck supple.      Right lower leg: No edema.      Left lower leg: No edema.   Lymphadenopathy:      Cervical: No cervical adenopathy.   Skin:     General: Skin is warm and dry.      Findings: No rash.   Neurological:      General: No focal deficit present.      Mental Status: He is alert.   Psychiatric:         Mood and Affect: Mood normal.         Behavior: Behavior normal.         Thought Content: Thought content normal.         Judgment: Judgment normal.         Assessment/Plan   Assessment & Plan  Right inguinal hernia  Recommend surgical consultation as discussed possible treatment  Orders:    Referral to General Surgery; Future    Essential hypertension  BP suboptimally controlled.  Add lisinopril 10 mg to current lisinopril HCTZ  Lab work as ordered prior to follow-up  Orders:    lisinopril 10 mg tablet; Take 1 tablet (10 mg) by mouth once daily. Take with lisinopril/hydrochlorothiazide 10-12.5 mg    CBC and Auto Differential; Future    Comprehensive metabolic panel; Future    TSH with reflex to Free T4 if abnormal; Future    Mixed hyperlipidemia    Orders:    TSH with reflex to Free T4 if abnormal; Future

## 2025-02-22 NOTE — ASSESSMENT & PLAN NOTE
BP suboptimally controlled.  Add lisinopril 10 mg to current lisinopril HCTZ  Lab work as ordered prior to follow-up  Orders:    lisinopril 10 mg tablet; Take 1 tablet (10 mg) by mouth once daily. Take with lisinopril/hydrochlorothiazide 10-12.5 mg    CBC and Auto Differential; Future    Comprehensive metabolic panel; Future    TSH with reflex to Free T4 if abnormal; Future

## 2025-02-24 DIAGNOSIS — Z96.641 S/P TOTAL RIGHT HIP ARTHROPLASTY: Primary | ICD-10-CM

## 2025-02-27 ENCOUNTER — HOSPITAL ENCOUNTER (OUTPATIENT)
Dept: RADIOLOGY | Facility: CLINIC | Age: 80
Discharge: HOME | End: 2025-02-27
Payer: MEDICARE

## 2025-02-27 ENCOUNTER — APPOINTMENT (OUTPATIENT)
Dept: ORTHOPEDIC SURGERY | Facility: CLINIC | Age: 80
End: 2025-02-27
Payer: MEDICARE

## 2025-02-27 DIAGNOSIS — Z96.641 S/P TOTAL RIGHT HIP ARTHROPLASTY: ICD-10-CM

## 2025-02-27 DIAGNOSIS — Z96.641 S/P TOTAL RIGHT HIP ARTHROPLASTY: Primary | ICD-10-CM

## 2025-02-27 PROCEDURE — 73502 X-RAY EXAM HIP UNI 2-3 VIEWS: CPT | Mod: RT

## 2025-02-27 NOTE — PROGRESS NOTES
No chief complaint on file.      The patient is here for follow-up of their side: right hip arthroplasty.  The patient has no hip pain.  The patient has no mechanical symptoms.  The patient is approximately  3 years  postop.    Physical examination:    Examination of the side: right hip  Range of motion: Forward Flexion: 120 Internal Rotation: 20 External Rotation: 30 Abduction 30  The Patient can single leg stand and actively abduct  Calf is soft, Homans negative  The patient has intact ankle dorsiflexion and plantarflexion.    Radiographs:  See dictated report      Impression:  Status post side: right total hip arthroplasty    Plan:  Discussed the continued importance of prophylactic dental antibiotics  Follow up as needed  All questions answered

## 2025-02-28 ENCOUNTER — APPOINTMENT (OUTPATIENT)
Dept: SURGERY | Facility: CLINIC | Age: 80
End: 2025-02-28
Payer: MEDICARE

## 2025-02-28 VITALS
WEIGHT: 176.6 LBS | HEIGHT: 69 IN | BODY MASS INDEX: 26.16 KG/M2 | OXYGEN SATURATION: 96 % | HEART RATE: 56 BPM | RESPIRATION RATE: 16 BRPM | DIASTOLIC BLOOD PRESSURE: 90 MMHG | SYSTOLIC BLOOD PRESSURE: 168 MMHG | TEMPERATURE: 97.6 F

## 2025-02-28 DIAGNOSIS — K40.90 RIGHT INGUINAL HERNIA: Primary | ICD-10-CM

## 2025-02-28 PROCEDURE — 3077F SYST BP >= 140 MM HG: CPT | Performed by: SURGERY

## 2025-02-28 PROCEDURE — 99213 OFFICE O/P EST LOW 20 MIN: CPT | Performed by: SURGERY

## 2025-02-28 PROCEDURE — 3080F DIAST BP >= 90 MM HG: CPT | Performed by: SURGERY

## 2025-02-28 PROCEDURE — 1123F ACP DISCUSS/DSCN MKR DOCD: CPT | Performed by: SURGERY

## 2025-02-28 PROCEDURE — 1159F MED LIST DOCD IN RCRD: CPT | Performed by: SURGERY

## 2025-02-28 PROCEDURE — 1036F TOBACCO NON-USER: CPT | Performed by: SURGERY

## 2025-02-28 RX ORDER — HEPARIN SODIUM 5000 [USP'U]/ML
5000 INJECTION, SOLUTION INTRAVENOUS; SUBCUTANEOUS ONCE
OUTPATIENT
Start: 2025-02-28 | End: 2025-02-28

## 2025-02-28 ASSESSMENT — ENCOUNTER SYMPTOMS
GASTROINTESTINAL NEGATIVE: 1
PSYCHIATRIC NEGATIVE: 1
RESPIRATORY NEGATIVE: 1
ALLERGIC/IMMUNOLOGIC NEGATIVE: 1
ENDOCRINE NEGATIVE: 1
CARDIOVASCULAR NEGATIVE: 1
HEMATOLOGIC/LYMPHATIC NEGATIVE: 1
CONSTITUTIONAL NEGATIVE: 1
MUSCULOSKELETAL NEGATIVE: 1
NEUROLOGICAL NEGATIVE: 1

## 2025-02-28 NOTE — PROGRESS NOTES
Subjective   Patient ID: Soren Herrera is a 79 y.o. male who presents for New Patient Visit and Hernia (NPV- Right Inguinal Hernia ).  Patient reports new right groin bulge over the past few weeks. Denies significant lifting or strenuous activity, however reports occasional straining with bowel movements. Denies previous bulge before this. Denies chronic nausea, change in bowel functions, or recent weight change.         Review of Systems   Constitutional: Negative.    HENT: Negative.     Respiratory: Negative.     Cardiovascular: Negative.    Gastrointestinal: Negative.    Endocrine: Negative.    Genitourinary: Negative.    Musculoskeletal: Negative.    Skin: Negative.    Allergic/Immunologic: Negative.    Neurological: Negative.    Hematological: Negative.    Psychiatric/Behavioral: Negative.         Objective   Physical Exam  Constitutional:       Appearance: Normal appearance.   HENT:      Head: Normocephalic and atraumatic.   Cardiovascular:      Rate and Rhythm: Normal rate and regular rhythm.   Pulmonary:      Effort: Pulmonary effort is normal.      Breath sounds: Normal breath sounds.   Abdominal:      General: Abdomen is flat. There is no distension.      Palpations: Abdomen is soft.      Tenderness: There is no abdominal tenderness.   Genitourinary:     Comments: Small right inguinal hernia, non-tender, reducible. No palpable hernia on the left side.   Musculoskeletal:         General: Normal range of motion.   Skin:     General: Skin is warm and dry.   Neurological:      General: No focal deficit present.      Mental Status: He is alert and oriented to person, place, and time.   Psychiatric:         Mood and Affect: Mood normal.         Behavior: Behavior normal.         Assessment/Plan   Diagnoses and all orders for this visit:  Right inguinal hernia  -     Discussed natural history of inguinal hernias and options for repair. Discussed laparoscopic versus open approach and use of mesh. Recommend  "laparoscopic approach. Discussed recovery time and post-surgical limits on lifting. Patient agreed to this plan.     Discussion of the risks of surgery including bleeding, infection, injury to adjacent structures, chronic pain, infertility, mesh complications, or death was completed. Patient's questions answered. Agrees to proceed.   - Patient has living will with desire for \"No Heroics\". Does not want, \"to be hooked up to tubes and wires\". Discussed that he will be intubated for the procedure. Patient agrees to proceed.     -     Request for Pre-Admission Testing Visit; Future  Other orders  -     NPO Diet Except: Sips with meds; Effective now; Standing  -     Height and weight; Standing  -     Insert and maintain peripheral IV; Standing  -     Saline lock IV; Standing  -     Type And Screen; Standing  -     Inpatient consult to Respiratory Care; Standing  -     Place in outpatient/hospital ambulatory surgery; Standing  -     DNR (Patient allows mechanical ventilation); Standing  -     Vital Signs; Standing  -     Pulse oximetry, spot; Standing  -     Apply sequential compression device; Standing  -     heparin (porcine) injection 5,000 Units  -     clindamycin (Cleocin) 900 mg in sodium chloride 0.9% 50 mL IV         Donny Carrizales MD   General Surgery   02/28/25 1:36 PM   "

## 2025-03-11 ENCOUNTER — LAB (OUTPATIENT)
Dept: LAB | Facility: HOSPITAL | Age: 80
End: 2025-03-11
Payer: MEDICARE

## 2025-03-11 ENCOUNTER — PRE-ADMISSION TESTING (OUTPATIENT)
Dept: PREADMISSION TESTING | Facility: HOSPITAL | Age: 80
End: 2025-03-11
Payer: MEDICARE

## 2025-03-11 VITALS
TEMPERATURE: 97.5 F | SYSTOLIC BLOOD PRESSURE: 160 MMHG | WEIGHT: 170.64 LBS | BODY MASS INDEX: 25.27 KG/M2 | HEART RATE: 67 BPM | HEIGHT: 69 IN | RESPIRATION RATE: 16 BRPM | OXYGEN SATURATION: 96 % | DIASTOLIC BLOOD PRESSURE: 96 MMHG

## 2025-03-11 DIAGNOSIS — K40.90 RIGHT INGUINAL HERNIA: ICD-10-CM

## 2025-03-11 DIAGNOSIS — Z01.818 ENCOUNTER FOR OTHER PREPROCEDURAL EXAMINATION: Primary | ICD-10-CM

## 2025-03-11 DIAGNOSIS — Z01.818 PREOP EXAMINATION: Primary | ICD-10-CM

## 2025-03-11 DIAGNOSIS — K40.90 UNILATERAL INGUINAL HERNIA, WITHOUT OBSTRUCTION OR GANGRENE, NOT SPECIFIED AS RECURRENT: ICD-10-CM

## 2025-03-11 LAB
ANION GAP SERPL CALC-SCNC: 12 MMOL/L (ref 10–20)
ATRIAL RATE: 60 BPM
BUN SERPL-MCNC: 8 MG/DL (ref 6–23)
CALCIUM SERPL-MCNC: 9.3 MG/DL (ref 8.6–10.3)
CHLORIDE SERPL-SCNC: 101 MMOL/L (ref 98–107)
CO2 SERPL-SCNC: 31 MMOL/L (ref 21–32)
CREAT SERPL-MCNC: 0.81 MG/DL (ref 0.5–1.3)
EGFRCR SERPLBLD CKD-EPI 2021: 90 ML/MIN/1.73M*2
GLUCOSE SERPL-MCNC: 86 MG/DL (ref 74–99)
P AXIS: 47 DEGREES
P OFFSET: 167 MS
P ONSET: 133 MS
POTASSIUM SERPL-SCNC: 3.2 MMOL/L (ref 3.5–5.3)
PR INTERVAL: 176 MS
Q ONSET: 221 MS
QRS COUNT: 10 BEATS
QRS DURATION: 98 MS
QT INTERVAL: 452 MS
QTC CALCULATION(BAZETT): 452 MS
QTC FREDERICIA: 452 MS
R AXIS: -2 DEGREES
SODIUM SERPL-SCNC: 141 MMOL/L (ref 136–145)
T AXIS: -4 DEGREES
T OFFSET: 447 MS
VENTRICULAR RATE: 60 BPM

## 2025-03-11 PROCEDURE — 80048 BASIC METABOLIC PNL TOTAL CA: CPT

## 2025-03-11 PROCEDURE — 93010 ELECTROCARDIOGRAM REPORT: CPT | Performed by: INTERNAL MEDICINE

## 2025-03-11 PROCEDURE — 93005 ELECTROCARDIOGRAM TRACING: CPT

## 2025-03-11 PROCEDURE — 87081 CULTURE SCREEN ONLY: CPT | Mod: STJLAB

## 2025-03-11 PROCEDURE — 99202 OFFICE O/P NEW SF 15 MIN: CPT

## 2025-03-11 RX ORDER — CHLORHEXIDINE GLUCONATE ORAL RINSE 1.2 MG/ML
SOLUTION DENTAL
Qty: 473 ML | Refills: 0 | Status: SHIPPED | OUTPATIENT
Start: 2025-03-11

## 2025-03-11 ASSESSMENT — DUKE ACTIVITY SCORE INDEX (DASI)
CAN YOU CLIMB A FLIGHT OF STAIRS OR WALK UP A HILL: YES
CAN YOU WALK INDOORS, SUCH AS AROUND YOUR HOUSE: YES
CAN YOU DO LIGHT WORK AROUND THE HOUSE LIKE DUSTING OR WASHING DISHES: YES
CAN YOU PARTICIPATE IN MODERATE RECREATIONAL ACTIVITIES LIKE GOLF, BOWLING, DANCING, DOUBLES TENNIS OR THROWING A BASEBALL OR FOOTBALL: YES
CAN YOU RUN A SHORT DISTANCE: YES
CAN YOU TAKE CARE OF YOURSELF (EAT, DRESS, BATHE, OR USE TOILET): YES
CAN YOU DO MODERATE WORK AROUND THE HOUSE LIKE VACUUMING, SWEEPING FLOORS OR CARRYING GROCERIES: YES
DASI METS SCORE: 9
CAN YOU HAVE SEXUAL RELATIONS: YES
CAN YOU DO HEAVY WORK AROUND THE HOUSE LIKE SCRUBBING FLOORS OR LIFTING AND MOVING HEAVY FURNITURE: YES
CAN YOU DO YARD WORK LIKE RAKING LEAVES, WEEDING OR PUSHING A MOWER: YES
TOTAL_SCORE: 50.7
CAN YOU WALK A BLOCK OR TWO ON LEVEL GROUND: YES
CAN YOU PARTICIPATE IN STRENOUS SPORTS LIKE SWIMMING, SINGLES TENNIS, FOOTBALL, BASKETBALL, OR SKIING: NO

## 2025-03-11 ASSESSMENT — LIFESTYLE VARIABLES: SMOKING_STATUS: NONSMOKER

## 2025-03-11 ASSESSMENT — ACTIVITIES OF DAILY LIVING (ADL): ADL_SCORE: 0

## 2025-03-11 ASSESSMENT — PAIN - FUNCTIONAL ASSESSMENT: PAIN_FUNCTIONAL_ASSESSMENT: 0-10

## 2025-03-11 ASSESSMENT — PAIN SCALES - GENERAL: PAINLEVEL_OUTOF10: 0 - NO PAIN

## 2025-03-11 NOTE — CPM/PAT H&P
CPM/PAT Evaluation       Name: Soren Herrera (Soren Herrera)  /Age: 1945/79 y.o.     In-Person       Chief Complaint: Inguinal hernia     HPI  Pleasant 80 y/o male presents with right inguinal hernia scheduled for right repair inguinal hernia repair on 3/19/25. States he has had a few months of pain. Endorsed a sharp pain at first and now it is tender. Endorses bulging. Denies other associated symptoms. Denies recent fever/illness/chills.     Past Medical History:   Diagnosis Date    BPH (benign prostatic hyperplasia)     Combined form of senile cataract of both eyes 2024    GERD (gastroesophageal reflux disease)     History of total hip replacement 2024    Hyperlipidemia     Hypertension     Hypo-osmolality and hyponatremia     Hyponatremia    Other chest pain     Atypical chest pain    PVC (premature ventricular contraction) 2024    Unspecified disorder of ear, unspecified ear 2021    Ear, nose and throat disorder       Past Surgical History:   Procedure Laterality Date    CATARACT EXTRACTION Right     HAND SURGERY  10/05/2018    Hand Surgery                                                                                                                                                          HAND SURGERY Right     HIP ARTHROPLASTY Right     OTHER SURGICAL HISTORY  10/05/2018    Open Treatment Of Orbital Fracture ('Blowout')    OTHER SURGICAL HISTORY  2022    Hip replacement    OTHER SURGICAL HISTORY  10/16/2021    Facial surgery    OTHER SURGICAL HISTORY  2018    Esophagogastroduodenoscopy    OTHER SURGICAL HISTORY  2018    Heller myotomy    OTHER SURGICAL HISTORY  2018    Toupet fundoplication laparoscopic    THROAT SURGERY         Patient Sexual activity questions deferred to the physician.    Family History   Problem Relation Name Age of Onset    Ovarian cancer Mother  57    Heart attack Father  68    Colon cancer Brother  71       Allergies   Allergen  Reactions    Amoxicillin Hives    Cefazolin Hives       Prior to Admission medications    Medication Sig Start Date End Date Taking? Authorizing Provider   clindamycin (Cleocin HCL) 300 mg capsule Take 2 capsules by mouth 1 hour before dental procedure, none after  Patient not taking: Reported on 2/28/2025 2/16/24   John Martinez PA-C   lisinopril 10 mg tablet Take 1 tablet (10 mg) by mouth once daily. Take with lisinopril/hydrochlorothiazide 10-12.5 mg 2/21/25 3/28/26  Tete Arguello MD   lisinopriL-hydrochlorothiazide 10-12.5 mg tablet Take 1 tablet by mouth once daily. 11/25/24 5/24/25  Tete Arguello MD   multivit-min-FA-lycopen-lutein (ESSENTIAL Man) 0.4-2-250 mg-mg-mcg tablet Take 1 tablet by mouth once daily.    Historical Provider, MD   ramelteon (Rozerem) 8 mg tablet Take 1 tablet (8 mg) by mouth once daily at bedtime.  Patient not taking: Reported on 2/28/2025 12/9/24 12/9/25  Tete Arguello MD   rosuvastatin (Crestor) 10 mg tablet Take 1 tablet (10 mg) by mouth once daily. 11/25/24 5/24/25  Tete Arguello MD        Constitutional: Negative for fever, chills, or sweats   ENMT: Negative for nasal discharge, congestion, ear pain, mouth pain, throat pain. Positive for glasses. Positive for bilat. Hearing aids.   Respiratory: Negative for cough, wheezing, shortness of breath   Cardiac: Negative for chest pain, dyspnea on exertion, palpitations   Gastrointestinal: Negative for nausea, vomiting, diarrhea, constipation, abdominal pain  Genitourinary: Negative for dysuria, flank pain, frequency, hematuria   Musculoskeletal: Negative for decreased ROM, pain, swelling, weakness   Neurological: Negative for dizziness, confusion, headache  Psychiatric: Negative for mood changes   Skin: Negative for itching, rash, ulcer . Positive for chronic dry skin on his back. Hematologic/Lymph: Negative for bruising, easy bleeding  Allergic/Immunologic: Negative itching, sneezing, swelling      Physical  "Exam  Vitals reviewed.   Constitutional:       Appearance: Normal appearance.   HENT:      Head: Normocephalic.      Mouth/Throat:      Mouth: Mucous membranes are moist.      Pharynx: Oropharynx is clear.   Eyes:      Pupils: Pupils are equal, round, and reactive to light.   Cardiovascular:      Rate and Rhythm: Normal rate and regular rhythm.      Heart sounds: Normal heart sounds.   Pulmonary:      Effort: Pulmonary effort is normal.      Breath sounds: Normal breath sounds.   Abdominal:      General: Bowel sounds are normal.      Palpations: Abdomen is soft.   Musculoskeletal:         General: Normal range of motion.      Cervical back: Normal range of motion.   Skin:     General: Skin is warm and dry.   Neurological:      General: No focal deficit present.      Mental Status: He is alert and oriented to person, place, and time.   Psychiatric:         Mood and Affect: Mood normal.         Behavior: Behavior normal.          PAT AIRWAY:   Airway:     Mallampati::  II    Neck ROM::  Full  normal        Testing/Diagnostic:     Patient Specialist/PCP:   PCP: Dr. Arguello     Visit Vitals  BP (!) 160/96   Pulse 67   Temp 36.4 °C (97.5 °F) (Tympanic)   Resp 16   Ht 1.753 m (5' 9\")   Wt 77.4 kg (170 lb 10.2 oz)   SpO2 96%   BMI 25.20 kg/m²   Smoking Status Former   BSA 1.94 m²       DASI Risk Score      [place] [facility] [place] you take care of yourself (eat, dress, bathe, or use toilet)?  2.75 filed at 03/11/2025 1046   Can you walk indoors, such as around your house? 1.75 filed at 03/11/2025 1046   Can you walk a block or two on level ground?  2.75 filed at 03/11/2025 1046   Can you climb a flight of stairs or walk up a hill? 5.5 filed at 03/11/2025 1046   Can you run a short distance? 8 filed at 03/11/2025 1046   Can you do light work around the house like dusting or washing dishes? 2.7 filed at 03/11/2025 1046   Can you do moderate work around the house " like vacuuming, sweeping floors or carrying groceries? 3.5 filed at 03/11/2025 1046   Can you do heavy work around the house like scrubbing floors or lifting and moving heavy furniture?  8 filed at 03/11/2025 1046   Can you do yard work like raking leaves, weeding or pushing a mower? 4.5 filed at 03/11/2025 1046   Can you have sexual relations? 5.25 filed at 03/11/2025 1046   Can you participate in moderate recreational activities like golf, bowling, dancing, doubles tennis or throwing a baseball or football? 6 filed at 03/11/2025 1046   Can you participate in strenous sports like swimming, singles tennis, football, basketball, or skiing? 0 filed at 03/11/2025 1046   DASI SCORE 50.7 filed at 03/11/2025 1046   METS Score (Will be calculated only when all the questions are answered) 9 filed at 03/11/2025 1046          Caprini DVT Assessment      Flowsheet Row Pre-Admission Testing from 3/11/2025 in [facility] [place]   DVT Score (IF A SCORE IS NOT CALCULATING, MUST SELECT A BMI TO COMPLETE) 6 filed at 03/11/2025 1045   Surgical Factors Major surgery planned, including arthroscopic and laproscopic (1-2 hours) filed at 03/11/2025 1045   BMI (BMI MUST BE CHOSEN) 30 or less filed at 03/11/2025 1045          Modified Frailty Index      Flowsheet Row Pre-Admission Testing from 3/11/2025 in [facility] [place]   Non-independent functional status (problems with dressing, bathing, personal grooming, or cooking) 0 filed at 03/11/2025 1047   History of diabetes mellitus  0 filed at 03/11/2025 1047   History of COPD 0 filed at 03/11/2025 1047   History of CHF No filed at 03/11/2025 1047   History of MI 0 filed at 03/11/2025 1047   History of Percutaneous Coronary Intervention, Cardiac Surgery, or Angina No filed at 03/11/2025 1047   Hypertension requiring the use of medication  0.0909 filed at 03/11/2025 1047   Peripheral vascular disease 0 filed at 03/11/2025 1047   Impaired sensorium (cognitive impairement or  loss, clouding, or delirium) 0 filed at 03/11/2025 1047   TIA or CVA withouy residual deficit 0 filed at 03/11/2025 1047   Cerebrovascular accident with deficit 0 filed at 03/11/2025 1047   Modified Frailty Index Calculator .0909 filed at 03/11/2025 1047          [id number] Stroke Risk Points  Current as of just now        N/A 0 to 9 Points:      Last Change: N/A          The [id number] risk score (Lip ARDEN, et al. 2009. © 2010 [employer]) quantifies the risk of stroke for a patient with atrial fibrillation. For patients without atrial fibrillation or under the age of 18 this score appears as N/A. Higher score values generally indicate higher risk of stroke.        This score is not applicable to this patient. Components are not calculated.          Revised Cardiac Risk Index      Flowsheet Row Pre-Admission Testing from 3/11/2025 in [facility] [place]   High-Risk Surgery (Intraperitoneal, Intrathoracic,Suprainguinal vascular) 0 filed at 03/11/2025 1046   History of ischemic heart disease (History of MI, History of positive exercuse test, Current chest paint considered due to myocardial ischemia, Use of nitrate therapy, ECG with pathological Q Waves) 0 filed at 03/11/2025 1046   History of congestive heart failure (pulmonary edemia, bilateral rales or S3 gallop, Paroxysmal nocturnal dyspnea, CXR showing pulmonary vascular redistribution) 0 filed at 03/11/2025 1046   History of cerebrovascular disease (Prior TIA or stroke) 0 filed at 03/11/2025 1046   Pre-operative insulin treatment 0 filed at 03/11/2025 1046   Pre-operative creatinine>2 mg/dl 0 filed at 03/11/2025 1046   Revised Cardiac Risk Calculator 0 filed at 03/11/2025 1046          Apfel Simplified Score      Flowsheet Row Pre-Admission Testing from 3/11/2025 in [facility] [place]   Smoking status 1 filed at 03/11/2025 1047   History of motion sickness or PONV  0 filed at 03/11/2025 1047   Use of postoperative  opioids 1 filed at 2025 1047   Gender - Female 0=No filed at 2025 1047   Apfel Simplified Score Calculator 2 filed at 2025 1047          Risk Analysis Index Results This Encounter         3/11/2025  1047             Do you live in a place other than your own home?: 0    When did you begin living in the place you are currently residing?: Greater than one year ago    Any kidney failure, kidney not working well, or seeing a kidney doctor (nephrologist)? If yes, was this for kidney stones or another problem?: 0 No    Any history of chronic (long-term) congestive heart failure (CHF)?: 0 No    Any shortness of breath when resting?: 0 No    In the past five years, have you been diagnosed with or treated for cancer?: No    During the last 3 months has it become difficult for you to remember things or organize your thoughts?: 1 Yes    Have you lost weight of 10 pounds or more in the past 3 months without trying?: 4 Yes    Do you have any loss of appetitie?: 4 Yes    Getting Around (Mobility): 0 Can get around without help    Eatin Can plan and prepare own meals    Toiletin Can use toilet without any help    Personal Hygiene (Bathing, Hand Washing, Changing Clothes): 0 Can shower or bathe without any help    PRECIADO Cancer History: Patient does not indicate history of cancer    Total Risk Analysis Index Score Without Cancer: 40    Total Risk Analysis Index Score: 40          Stop Bang Score      Flowsheet Row Pre-Admission Testing from 3/11/2025 in [facility] [place]   Do you snore loudly? 0 filed at 2025 1045   Do you often feel tired or fatigued after your sleep? 0 filed at 2025 1045   Has anyone ever observed you stop breathing in your sleep? 0 filed at 2025 1045   Do you have or are you being treated for high blood pressure? 1 filed at 2025 1045   Recent BMI (Calculated) 26.1 filed at 2025 1045   Is BMI greater than 35 kg/m2? 0=No filed at 2025 1045   Age  older than 50 years old? 1=Yes filed at 03/11/2025 1045   Is your neck circumference greater than 17 inches (Male) or 16 inches (Female)? 0 filed at 03/11/2025 1045   Gender - Male 1=Yes filed at 03/11/2025 1045   STOP-BANG Total Score 3 filed at 03/11/2025 1045          Prodigy: High Risk  Total Score: 20              Prodigy Age Score      Prodigy Gender Score          ARISCAT Score for Postoperative Pulmonary Complications      [place]sheet Row [place] [facility] [place]   [place] Calculated Score 3 filed at 03/11/2025 1047   Preoperative SpO2 0 filed at 03/11/2025 1047   Respiratory infection in the last month Either upper or lower (i.e., URI, bronchitis, pneumonia), with fever and antibiotic treatment 0 filed at 03/11/2025 1047   Preoperative anemia (Hgb less than 10 g/dl) 0 filed at 03/11/2025 1047   Surgical incision  0 filed at 03/11/2025 1047   Duration of surgery  16 filed at 03/11/2025 1047   Emergency Procedure  0 filed at 03/11/2025 1047   ARISCAT Total Score  19 filed at 03/11/2025 1047          Rachel Perioperative Risk for Myocardial Infarction or Cardiac Arrest (JANINE)      Flowsheet Row [place]-Ad[place] [facility] [place]   Calculated Age Score 1.58 filed at 03/11/2025 1048   Functional Status  0 filed at 03/11/2025 1048   ASA Class  0 filed at 03/11/2025 1048   Creatinine -0.10 filed at 03/11/2025 1048   Type of Procedure  0 filed at 03/11/2025 1048   JANINE Total Score  -3.77 filed at 03/11/2025 1048   JANINE % 2.25 filed at 03/11/2025 1048            Assessment and Plan:     Assessment and Plan:     Preop:   OR with Dr. Carrizales on 3/19/25 for right inguinal hernia repair   Labs ordered per anesthesia guidelines.   EKG obtained and enclosed. NSR. Comparable EKG on file     Neurologic:   The patient is at an increased risk for post operative delirium secondary to age >/=65  and polypharmacy . Preoperative brain exercise educational handout  provided to patient.  The patient is at an increased risk for perioperative stroke secondary to increased age, HTN, HLD, and general anesthesia.    Cardiac:  Hypertension: Normally stable however BP was slightly elevated today. He was rushing to appt.-he recently had his lisinopril increased to 20 mg daily.   Hyperlipidemia: On statin   Duke Activity Status Index (DASI)  DASI Score: 50.7   MET Score: 9  RCRI  0 which is 3.9% 30 day risk of MACE (risk for cardiac death, nonfatal myocardial infarction, and nonfactal cardiac arrest)  JANINE score which indicates a  0.09 % risk of intraoperative or 30-day postoperative MACE    Pulmonary:   STOP-BANG score of  3.  Intermediate risk of obstructive sleep apnea.   ARISCAT:  19    points which is a low (1.6%) risk of in-hospital post-op pulmonary complications     GI:  GERD: States it is gone   Apfel: 2 points 39% risk for post operative N/V    Neuro-muscular:   Osteoarthritis: S/P hip replacement     Hematologic:   Caprini score 6, patient at high risk for perioperative DVT. Patient provided with VTE education/handout.     Skin check: Patient was instructed to make surgeon aware of any skin changes/concerns prior to surgery.     Anesthesia: States he woke up combative once-states 6 or 7 years ago.     *See risk scores as previously documented

## 2025-03-11 NOTE — PREPROCEDURE INSTRUCTIONS
Medication List            Accurate as of March 11, 2025 11:00 AM. Always use your most recent med list.                chlorhexidine 0.12 % solution  Commonly known as: Peridex  15 milliliter(s) orally once a day for 2 doses 15 ml  the night before surgery and 15 ml morning of surgery - swish for 30 seconds -DO NOT SWALLOW, SPIT OUT     clindamycin 300 mg capsule  Commonly known as: Cleocin HCL  Take 2 capsules by mouth 1 hour before dental procedure, none after     lisinopril 10 mg tablet  Take 1 tablet (10 mg) by mouth once daily. Take with lisinopril/hydrochlorothiazide 10-12.5 mg  Additional Medication Adjustments for Surgery: Other (Comment)  Notes to patient: HOLD any evening dose the night before the day of surgery  HOLD the day of surgery     lisinopriL-hydrochlorothiazide 10-12.5 mg tablet  Take 1 tablet by mouth once daily.  Additional Medication Adjustments for Surgery: Other (Comment)  Notes to patient: HOLD any evening dose the night before the day of surgery  HOLD the day of surgery     ramelteon 8 mg tablet  Commonly known as: Rozerem  Take 1 tablet (8 mg) by mouth once daily at bedtime.  Additional Medication Adjustments for Surgery: Other (Comment)  Notes to patient: Not taking      rosuvastatin 10 mg tablet  Commonly known as: Crestor  Take 1 tablet (10 mg) by mouth once daily.  Medication Adjustments for Surgery: Take/Use as prescribed                   /    PRE-OPERATIVE INSTRUCTIONS    You will receive notification one business day prior to your procedure to confirm your arrival time. It is important that you answer your phone and/or check your messages during this time. If you do not hear from the surgery center by 5 pm. the day before your procedure, please call [phone].     Please enter the building through the Outpatient entrance and take the elevator off the lobby to the 2nd floor then check in at the Outpatient Surgery desk on the 2nd floor.    INSTRUCTIONS:  Talk to your surgeon  for instructions if you should stop your aspirin, blood thinner, or diabetes medicines.  DO NOT take any multivitamins or over the counter supplements for 7-10 days before surgery.  If not being admitted, you must have an adult immediately available to drive you home after surgery. We also highly recommend you have someone stay with you for the entire day and night of your surgery.  For children having surgery, a parent or legal guardian must accompany them to the surgery center. If this is not possible, please call [phone] to make additional arrangements.  For adults who are unable to consent or make medical decisions for themselves, a legal guardian or Power of  must accompany them to the surgery center. If this is not possible, please call [phone] to make additional arrangements.  Wear comfortable, loose fitting clothing.  All jewelry and piercings must be removed. If you are unable to remove an item or have a dermal piercing, please be sure to tell the nurse when you arrive for surgery.  Nail polish and make-up must be removed.  Avoid smoking or consuming alcohol for 24 hours before surgery.  To help prevent infection, please take a shower/bath and wash your hair the night before and/or morning of surgery (or follow other specific bathing instructions provided).    Preoperative Fasting Guidelines    Why must I stop eating and drinking near surgery time?  With sedation, food or liquid in your stomach can enter your lungs causing serious complications  Increases nausea and vomiting    When do I need to stop eating and drinking before my surgery?  Do not eat any solid food after midnight the night before your surgery/procedure unless otherwise instructed by your surgeon.   You may have up to 13.5 ounces of clear liquid until TWO hours before your instructed arrival time to the hospital.  This includes water, black tea/coffee, (no milk or cream) apple juice, and electrolyte drinks (Gatorade).   You  may chew gum until TWO hours before your surgery/procedure      If applicable, notify your surgeons office immediately of any new skin changes that occur to the surgical limb.      If you have any questions or concerns, please call Pre-Admission Testing at [phone].

## 2025-03-11 NOTE — PREPROCEDURE INSTRUCTIONS
Thank you for visiting [facility] at [facility]. If you have any changes to your health condition, please call the SURGEON's office to alert them and give them details of your symptoms.  STOP all NSAIDS (Ibuprofen, Motrin, Aleve), vitamins, herbals, supplements, and all over the counter medications for 7 days prior to surgery  Tylenol is okay to take up until the morning of surgery for pain or headache if needed         Preoperative Brain Exercises    What are brain exercises?  A brain exercise is any activity that engages your thinking (cognitive) skills.    What types of activities are considered brain exercises?  Jigsaw puzzles, crossword puzzles, word jumble, memory games, word search, and many more.  Many can be found free online or on your phone via a mobile isma.    Why should I do brain exercises before my surgery?  More recent research has shown brain exercise before surgery can lower the risk of postoperative delirium (confusion) which can be especially important for older adults.  Patients who did brain exercises for 5 to 10 hours the days before surgery, cut their risk of postoperative delirium in half up to 1 week after surgery.      Preoperative Deep Breathing Exercises    Why it is important to do deep breathing exercises before my surgery?  Deep breathing exercises strengthen your breathing muscles.  This helps you to recover after your surgery and decreases the chance of breathing complications.    How are the deep breathing exercises done?  Sit straight with your back supported.  Breathe in deeply and slowly through your nose. Your lower rib cage should expand and your abdomen may move forward.  Hold that breath for 3 to 5 seconds.  Breathe out through pursed lips, slowly and completely.  Rest and repeat 10 times every hour while awake.  Rest longer if you become dizzy or lightheaded.      Patient and Family Education   Ways You Can Help Prevent Blood Clots     This handout explains some simple  things you can do to help prevent blood clots.      Blood clots are blockages that can form in the body's veins. When a blood clot forms in your deep veins, it may be called a deep vein thrombosis, or DVT for short. Blood clots can happen in any part of the body where blood flows, but they are most common in the arms and legs. If a piece of a blood clot breaks free and travels to the lungs, it is called a pulmonary embolus (PE). A PE can be a very serious problem.      Being in the hospital or having surgery can raise your chances of getting a blood clot because you may not be well enough to move around as much as you normally do.      Ways you can help prevent blood clots in the hospital         Wearing SCDs. SCDs stands for Sequential Compression Devices.   SCDs are special sleeves that wrap around your legs  They attach to a pump that fills them with air to gently squeeze your legs every few minutes.   This helps return the blood in your legs to your heart.   SCDs should only be taken off when walking or bathing.   SCDs may not be comfortable, but they can help save your life.               Wearing compression stockings - if your doctor orders them. These special snug fitting stockings gently squeeze your legs to help blood flow.       Walking. Walking helps move the blood in your legs.   If your doctor says it is ok, try walking the halls at least   5 times a day. Ask us to help you get up, so you don't fall.      Taking any blood thinning medicines your doctor orders.          ©[facility]; 3/23        Ways you can help prevent blood clots at home       Wearing compression stockings - if your doctor orders them. ? Walking - to help move the blood in your legs.       Taking any blood thinning medicines your doctor orders.      Signs of a blood clot or PE      Tell your doctor or nurse know right away if you have of the problems listed below.    If you are at home, seek medical care right away. Call [phone]  for chest pain or problems breathing.          Signs of a blood clot (DVT) - such as pain,  swelling, redness or warmth in your arm or leg      Signs of a pulmonary embolism (PE) - such as chest     pain or feeling short of breath

## 2025-03-13 LAB — STAPHYLOCOCCUS SPEC CULT: ABNORMAL

## 2025-03-19 ENCOUNTER — ANESTHESIA EVENT (OUTPATIENT)
Dept: OPERATING ROOM | Facility: HOSPITAL | Age: 80
End: 2025-03-19
Payer: MEDICARE

## 2025-03-19 ENCOUNTER — PHARMACY VISIT (OUTPATIENT)
Dept: PHARMACY | Facility: CLINIC | Age: 80
End: 2025-03-19
Payer: COMMERCIAL

## 2025-03-19 ENCOUNTER — ANESTHESIA (OUTPATIENT)
Dept: OPERATING ROOM | Facility: HOSPITAL | Age: 80
End: 2025-03-19
Payer: MEDICARE

## 2025-03-19 ENCOUNTER — HOSPITAL ENCOUNTER (OUTPATIENT)
Facility: HOSPITAL | Age: 80
Setting detail: OUTPATIENT SURGERY
Discharge: HOME | End: 2025-03-19
Attending: SURGERY | Admitting: SURGERY
Payer: MEDICARE

## 2025-03-19 VITALS
TEMPERATURE: 98.6 F | SYSTOLIC BLOOD PRESSURE: 167 MMHG | RESPIRATION RATE: 18 BRPM | DIASTOLIC BLOOD PRESSURE: 80 MMHG | HEIGHT: 69 IN | OXYGEN SATURATION: 95 % | WEIGHT: 160 LBS | HEART RATE: 90 BPM | BODY MASS INDEX: 23.7 KG/M2

## 2025-03-19 DIAGNOSIS — K40.20 NON-RECURRENT BILATERAL INGUINAL HERNIA WITHOUT OBSTRUCTION OR GANGRENE: Primary | ICD-10-CM

## 2025-03-19 DIAGNOSIS — K40.90 RIGHT INGUINAL HERNIA: ICD-10-CM

## 2025-03-19 LAB
ABO GROUP (TYPE) IN BLOOD: NORMAL
ANTIBODY SCREEN: NORMAL
RH FACTOR (ANTIGEN D): NORMAL

## 2025-03-19 PROCEDURE — C1781 MESH (IMPLANTABLE): HCPCS | Performed by: SURGERY

## 2025-03-19 PROCEDURE — RXMED WILLOW AMBULATORY MEDICATION CHARGE

## 2025-03-19 PROCEDURE — A49650 PR LAP,INGUINAL HERNIA REPR,INITIAL: Performed by: ANESTHESIOLOGIST ASSISTANT

## 2025-03-19 PROCEDURE — 2780000003 HC OR 278 NO HCPCS: Performed by: SURGERY

## 2025-03-19 PROCEDURE — 3700000001 HC GENERAL ANESTHESIA TIME - INITIAL BASE CHARGE: Performed by: SURGERY

## 2025-03-19 PROCEDURE — 49650 LAP ING HERNIA REPAIR INIT: CPT | Performed by: SURGERY

## 2025-03-19 PROCEDURE — 2500000005 HC RX 250 GENERAL PHARMACY W/O HCPCS: Performed by: ANESTHESIOLOGIST ASSISTANT

## 2025-03-19 PROCEDURE — 3600000003 HC OR TIME - INITIAL BASE CHARGE - PROCEDURE LEVEL THREE: Performed by: SURGERY

## 2025-03-19 PROCEDURE — 2500000004 HC RX 250 GENERAL PHARMACY W/ HCPCS (ALT 636 FOR OP/ED): Performed by: SURGERY

## 2025-03-19 PROCEDURE — 3700000002 HC GENERAL ANESTHESIA TIME - EACH INCREMENTAL 1 MINUTE: Performed by: SURGERY

## 2025-03-19 PROCEDURE — 7100000009 HC PHASE TWO TIME - INITIAL BASE CHARGE: Performed by: SURGERY

## 2025-03-19 PROCEDURE — 7100000001 HC RECOVERY ROOM TIME - INITIAL BASE CHARGE: Performed by: SURGERY

## 2025-03-19 PROCEDURE — 2500000001 HC RX 250 WO HCPCS SELF ADMINISTERED DRUGS (ALT 637 FOR MEDICARE OP): Performed by: SURGERY

## 2025-03-19 PROCEDURE — 86901 BLOOD TYPING SEROLOGIC RH(D): CPT | Performed by: SURGERY

## 2025-03-19 PROCEDURE — 3600000008 HC OR TIME - EACH INCREMENTAL 1 MINUTE - PROCEDURE LEVEL THREE: Performed by: SURGERY

## 2025-03-19 PROCEDURE — A49650 PR LAP,INGUINAL HERNIA REPR,INITIAL: Performed by: ANESTHESIOLOGY

## 2025-03-19 PROCEDURE — 2720000007 HC OR 272 NO HCPCS: Performed by: SURGERY

## 2025-03-19 PROCEDURE — 36415 COLL VENOUS BLD VENIPUNCTURE: CPT | Performed by: SURGERY

## 2025-03-19 PROCEDURE — 96372 THER/PROPH/DIAG INJ SC/IM: CPT | Performed by: SURGERY

## 2025-03-19 PROCEDURE — 7100000002 HC RECOVERY ROOM TIME - EACH INCREMENTAL 1 MINUTE: Performed by: SURGERY

## 2025-03-19 PROCEDURE — 7100000010 HC PHASE TWO TIME - EACH INCREMENTAL 1 MINUTE: Performed by: SURGERY

## 2025-03-19 PROCEDURE — 2500000004 HC RX 250 GENERAL PHARMACY W/ HCPCS (ALT 636 FOR OP/ED): Mod: JZ | Performed by: ANESTHESIOLOGY

## 2025-03-19 PROCEDURE — 99100 ANES PT EXTEME AGE<1 YR&>70: CPT | Performed by: ANESTHESIOLOGY

## 2025-03-19 PROCEDURE — 2500000004 HC RX 250 GENERAL PHARMACY W/ HCPCS (ALT 636 FOR OP/ED): Performed by: ANESTHESIOLOGIST ASSISTANT

## 2025-03-19 DEVICE — BARD MESH, 3" X 6" (7.6 CM X 15 CM)
Type: IMPLANTABLE DEVICE | Site: INGUINAL | Status: FUNCTIONAL
Brand: BARD

## 2025-03-19 RX ORDER — LIDOCAINE HYDROCHLORIDE 20 MG/ML
INJECTION, SOLUTION EPIDURAL; INFILTRATION; INTRACAUDAL; PERINEURAL AS NEEDED
Status: DISCONTINUED | OUTPATIENT
Start: 2025-03-19 | End: 2025-03-19

## 2025-03-19 RX ORDER — HYDROMORPHONE HYDROCHLORIDE 0.2 MG/ML
0.1 INJECTION INTRAMUSCULAR; INTRAVENOUS; SUBCUTANEOUS EVERY 5 MIN PRN
Status: DISCONTINUED | OUTPATIENT
Start: 2025-03-19 | End: 2025-03-19 | Stop reason: HOSPADM

## 2025-03-19 RX ORDER — METOPROLOL TARTRATE 1 MG/ML
2.5 INJECTION, SOLUTION INTRAVENOUS EVERY 30 MIN PRN
Status: DISCONTINUED | OUTPATIENT
Start: 2025-03-19 | End: 2025-03-19 | Stop reason: HOSPADM

## 2025-03-19 RX ORDER — ALBUTEROL SULFATE 0.83 MG/ML
2.5 SOLUTION RESPIRATORY (INHALATION) ONCE AS NEEDED
Status: DISCONTINUED | OUTPATIENT
Start: 2025-03-19 | End: 2025-03-19 | Stop reason: HOSPADM

## 2025-03-19 RX ORDER — NEOSTIGMINE METHYLSULFATE 1 MG/ML
INJECTION INTRAVENOUS AS NEEDED
Status: DISCONTINUED | OUTPATIENT
Start: 2025-03-19 | End: 2025-03-19

## 2025-03-19 RX ORDER — ROCURONIUM BROMIDE 50 MG/5 ML
SYRINGE (ML) INTRAVENOUS AS NEEDED
Status: DISCONTINUED | OUTPATIENT
Start: 2025-03-19 | End: 2025-03-19

## 2025-03-19 RX ORDER — FENTANYL CITRATE 50 UG/ML
INJECTION, SOLUTION INTRAMUSCULAR; INTRAVENOUS AS NEEDED
Status: DISCONTINUED | OUTPATIENT
Start: 2025-03-19 | End: 2025-03-19

## 2025-03-19 RX ORDER — PROPOFOL 10 MG/ML
INJECTION, EMULSION INTRAVENOUS AS NEEDED
Status: DISCONTINUED | OUTPATIENT
Start: 2025-03-19 | End: 2025-03-19

## 2025-03-19 RX ORDER — CLINDAMYCIN PHOSPHATE 900 MG/50ML
900 INJECTION, SOLUTION INTRAVENOUS ONCE
Status: COMPLETED | OUTPATIENT
Start: 2025-03-19 | End: 2025-03-19

## 2025-03-19 RX ORDER — ACETAMINOPHEN 325 MG/1
650 TABLET ORAL EVERY 4 HOURS PRN
Status: DISCONTINUED | OUTPATIENT
Start: 2025-03-19 | End: 2025-03-19 | Stop reason: HOSPADM

## 2025-03-19 RX ORDER — HEPARIN SODIUM 5000 [USP'U]/ML
5000 INJECTION, SOLUTION INTRAVENOUS; SUBCUTANEOUS ONCE
Status: COMPLETED | OUTPATIENT
Start: 2025-03-19 | End: 2025-03-19

## 2025-03-19 RX ORDER — SUCCINYLCHOLINE CHLORIDE 100 MG/5ML
SYRINGE (ML) INTRAVENOUS AS NEEDED
Status: DISCONTINUED | OUTPATIENT
Start: 2025-03-19 | End: 2025-03-19

## 2025-03-19 RX ORDER — ONDANSETRON 4 MG/1
4 TABLET, FILM COATED ORAL EVERY 8 HOURS PRN
Qty: 20 TABLET | Refills: 0 | Status: SHIPPED | OUTPATIENT
Start: 2025-03-19

## 2025-03-19 RX ORDER — BUPIVACAINE HYDROCHLORIDE 2.5 MG/ML
INJECTION, SOLUTION EPIDURAL; INFILTRATION; INTRACAUDAL; PERINEURAL AS NEEDED
Status: DISCONTINUED | OUTPATIENT
Start: 2025-03-19 | End: 2025-03-19 | Stop reason: HOSPADM

## 2025-03-19 RX ORDER — TRAMADOL HYDROCHLORIDE 50 MG/1
50 TABLET ORAL ONCE
Status: COMPLETED | OUTPATIENT
Start: 2025-03-19 | End: 2025-03-19

## 2025-03-19 RX ORDER — OXYCODONE HYDROCHLORIDE 10 MG/1
10 TABLET ORAL EVERY 4 HOURS PRN
Status: DISCONTINUED | OUTPATIENT
Start: 2025-03-19 | End: 2025-03-19 | Stop reason: HOSPADM

## 2025-03-19 RX ORDER — MIDAZOLAM HYDROCHLORIDE 1 MG/ML
1 INJECTION, SOLUTION INTRAMUSCULAR; INTRAVENOUS ONCE AS NEEDED
Status: DISCONTINUED | OUTPATIENT
Start: 2025-03-19 | End: 2025-03-19 | Stop reason: HOSPADM

## 2025-03-19 RX ORDER — HYDROMORPHONE HYDROCHLORIDE 1 MG/ML
INJECTION, SOLUTION INTRAMUSCULAR; INTRAVENOUS; SUBCUTANEOUS AS NEEDED
Status: DISCONTINUED | OUTPATIENT
Start: 2025-03-19 | End: 2025-03-19

## 2025-03-19 RX ORDER — PHENYLEPHRINE HCL IN 0.9% NACL 1 MG/10 ML
SYRINGE (ML) INTRAVENOUS AS NEEDED
Status: DISCONTINUED | OUTPATIENT
Start: 2025-03-19 | End: 2025-03-19

## 2025-03-19 RX ORDER — SODIUM CHLORIDE, SODIUM LACTATE, POTASSIUM CHLORIDE, CALCIUM CHLORIDE 600; 310; 30; 20 MG/100ML; MG/100ML; MG/100ML; MG/100ML
125 INJECTION, SOLUTION INTRAVENOUS CONTINUOUS
Status: ACTIVE | OUTPATIENT
Start: 2025-03-19 | End: 2025-03-19

## 2025-03-19 RX ORDER — SODIUM CHLORIDE, SODIUM LACTATE, POTASSIUM CHLORIDE, CALCIUM CHLORIDE 600; 310; 30; 20 MG/100ML; MG/100ML; MG/100ML; MG/100ML
INJECTION, SOLUTION INTRAVENOUS CONTINUOUS PRN
Status: DISCONTINUED | OUTPATIENT
Start: 2025-03-19 | End: 2025-03-19

## 2025-03-19 RX ORDER — ONDANSETRON HYDROCHLORIDE 2 MG/ML
4 INJECTION, SOLUTION INTRAVENOUS ONCE AS NEEDED
Status: DISCONTINUED | OUTPATIENT
Start: 2025-03-19 | End: 2025-03-19 | Stop reason: HOSPADM

## 2025-03-19 RX ORDER — GLYCOPYRROLATE 0.2 MG/ML
INJECTION INTRAMUSCULAR; INTRAVENOUS AS NEEDED
Status: DISCONTINUED | OUTPATIENT
Start: 2025-03-19 | End: 2025-03-19

## 2025-03-19 RX ORDER — ACETAMINOPHEN 325 MG/1
975 TABLET ORAL ONCE
Status: DISCONTINUED | OUTPATIENT
Start: 2025-03-19 | End: 2025-03-19 | Stop reason: HOSPADM

## 2025-03-19 RX ORDER — ONDANSETRON HYDROCHLORIDE 2 MG/ML
INJECTION, SOLUTION INTRAVENOUS AS NEEDED
Status: DISCONTINUED | OUTPATIENT
Start: 2025-03-19 | End: 2025-03-19

## 2025-03-19 RX ORDER — HYDROMORPHONE HYDROCHLORIDE 1 MG/ML
1 INJECTION, SOLUTION INTRAMUSCULAR; INTRAVENOUS; SUBCUTANEOUS EVERY 5 MIN PRN
Status: DISCONTINUED | OUTPATIENT
Start: 2025-03-19 | End: 2025-03-19 | Stop reason: HOSPADM

## 2025-03-19 RX ORDER — OXYCODONE AND ACETAMINOPHEN 5; 325 MG/1; MG/1
1 TABLET ORAL EVERY 4 HOURS PRN
Status: DISCONTINUED | OUTPATIENT
Start: 2025-03-19 | End: 2025-03-19 | Stop reason: HOSPADM

## 2025-03-19 RX ORDER — TRAMADOL HYDROCHLORIDE 50 MG/1
50 TABLET ORAL EVERY 8 HOURS PRN
Qty: 10 TABLET | Refills: 0 | Status: SHIPPED | OUTPATIENT
Start: 2025-03-19

## 2025-03-19 RX ORDER — HYDRALAZINE HYDROCHLORIDE 20 MG/ML
10 INJECTION INTRAMUSCULAR; INTRAVENOUS EVERY 30 MIN PRN
Status: DISCONTINUED | OUTPATIENT
Start: 2025-03-19 | End: 2025-03-19 | Stop reason: HOSPADM

## 2025-03-19 RX ADMIN — Medication 100 MG: at 07:39

## 2025-03-19 RX ADMIN — Medication 100 MCG: at 07:58

## 2025-03-19 RX ADMIN — Medication 10 MG: at 08:52

## 2025-03-19 RX ADMIN — HYDROMORPHONE HYDROCHLORIDE 0.5 MG: 1 INJECTION, SOLUTION INTRAMUSCULAR; INTRAVENOUS; SUBCUTANEOUS at 10:00

## 2025-03-19 RX ADMIN — GLYCOPYRROLATE 1 MG: 0.2 INJECTION, SOLUTION INTRAMUSCULAR; INTRAVENOUS at 09:25

## 2025-03-19 RX ADMIN — FENTANYL CITRATE 50 MCG: 50 INJECTION, SOLUTION INTRAMUSCULAR; INTRAVENOUS at 07:38

## 2025-03-19 RX ADMIN — PROPOFOL 150 MG: 10 INJECTION, EMULSION INTRAVENOUS at 07:38

## 2025-03-19 RX ADMIN — Medication 150 MCG: at 08:00

## 2025-03-19 RX ADMIN — Medication 100 MCG: at 07:52

## 2025-03-19 RX ADMIN — HEPARIN SODIUM 5000 UNITS: 5000 INJECTION INTRAVENOUS; SUBCUTANEOUS at 06:35

## 2025-03-19 RX ADMIN — HYDROMORPHONE HYDROCHLORIDE 0.5 MG: 1 INJECTION, SOLUTION INTRAMUSCULAR; INTRAVENOUS; SUBCUTANEOUS at 08:56

## 2025-03-19 RX ADMIN — FENTANYL CITRATE 25 MCG: 50 INJECTION, SOLUTION INTRAMUSCULAR; INTRAVENOUS at 08:25

## 2025-03-19 RX ADMIN — SODIUM CHLORIDE, POTASSIUM CHLORIDE, SODIUM LACTATE AND CALCIUM CHLORIDE: 600; 310; 30; 20 INJECTION, SOLUTION INTRAVENOUS at 07:34

## 2025-03-19 RX ADMIN — NEOSTIGMINE METHYLSULFATE 5 MG: 1 INJECTION INTRAVENOUS at 09:25

## 2025-03-19 RX ADMIN — TRAMADOL HYDROCHLORIDE 50 MG: 50 TABLET, COATED ORAL at 10:57

## 2025-03-19 RX ADMIN — DEXAMETHASONE SODIUM PHOSPHATE 8 MG: 4 INJECTION, SOLUTION INTRAMUSCULAR; INTRAVENOUS at 07:44

## 2025-03-19 RX ADMIN — Medication 10 MG: at 07:38

## 2025-03-19 RX ADMIN — ONDANSETRON 4 MG: 2 INJECTION INTRAMUSCULAR; INTRAVENOUS at 09:17

## 2025-03-19 RX ADMIN — CLINDAMYCIN PHOSPHATE 900 MG: 900 INJECTION, SOLUTION INTRAVENOUS at 07:44

## 2025-03-19 RX ADMIN — Medication 40 MG: at 07:48

## 2025-03-19 RX ADMIN — Medication 10 MG: at 08:15

## 2025-03-19 RX ADMIN — PROPOFOL 50 MG: 10 INJECTION, EMULSION INTRAVENOUS at 07:42

## 2025-03-19 RX ADMIN — FENTANYL CITRATE 25 MCG: 50 INJECTION, SOLUTION INTRAMUSCULAR; INTRAVENOUS at 08:15

## 2025-03-19 RX ADMIN — LIDOCAINE HYDROCHLORIDE 60 MG: 20 INJECTION, SOLUTION EPIDURAL; INFILTRATION; INTRACAUDAL; PERINEURAL at 07:38

## 2025-03-19 SDOH — HEALTH STABILITY: MENTAL HEALTH: CURRENT SMOKER: 0

## 2025-03-19 ASSESSMENT — PAIN SCALES - GENERAL
PAINLEVEL_OUTOF10: 6
PAINLEVEL_OUTOF10: 5 - MODERATE PAIN
PAIN_LEVEL: 3
PAINLEVEL_OUTOF10: 3

## 2025-03-19 ASSESSMENT — COLUMBIA-SUICIDE SEVERITY RATING SCALE - C-SSRS
2. HAVE YOU ACTUALLY HAD ANY THOUGHTS OF KILLING YOURSELF?: NO
6. HAVE YOU EVER DONE ANYTHING, STARTED TO DO ANYTHING, OR PREPARED TO DO ANYTHING TO END YOUR LIFE?: NO
1. IN THE PAST MONTH, HAVE YOU WISHED YOU WERE DEAD OR WISHED YOU COULD GO TO SLEEP AND NOT WAKE UP?: NO

## 2025-03-19 ASSESSMENT — PAIN - FUNCTIONAL ASSESSMENT
PAIN_FUNCTIONAL_ASSESSMENT: 0-10

## 2025-03-19 NOTE — ANESTHESIA POSTPROCEDURE EVALUATION
Patient: Soren Herrera    Procedure Summary       Date: 03/19/25 Room / Location: [facility] OR 05 / Virtual [facility] OR    Anesthesia Start: 0734 Anesthesia Stop: 0942    Procedure: REPAIR, HERNIA, INGUINAL, LAPAROSCOPIC (Bilateral: Abdomen) Diagnosis:       Non-recurrent bilateral inguinal hernia without obstruction or gangrene      (Right inguinal hernia [K40.90])    Surgeons: Donny Carrizales MD Responsible Provider: Cydney Lilly MD    Anesthesia Type: general ASA Status: 3            Anesthesia Type: general    Vitals Value Taken Time   /69 03/19/25 1030   Temp 36.6 °C (97.9 °F) 03/19/25 0940   Pulse 84 03/19/25 1030   Resp 19 03/19/25 1030   SpO2 93 % 03/19/25 1030       Anesthesia Post Evaluation    Patient location during evaluation: PACU  Patient participation: complete - patient participated  Level of consciousness: sleepy but conscious, responsive to light touch, responsive to verbal stimuli and sedated  Pain score: 3  Pain management: satisfactory to patient  Airway patency: patent  Two or more strategies used to mitigate risk of obstructive sleep apnea  Cardiovascular status: acceptable, hemodynamically stable and stable  Respiratory status: acceptable, unassisted, spontaneous ventilation, nonlabored ventilation and nasal cannula  Hydration status: acceptable  Postoperative Nausea and Vomiting: none        No notable events documented.

## 2025-03-19 NOTE — ANESTHESIA PREPROCEDURE EVALUATION
Patient: Soren Herrera    Procedure Information       Anesthesia Start Date/Time: 03/19/25 0734    Procedure: REPAIR, HERNIA, INGUINAL, LAPAROSCOPIC (Right)    Location: [facility] OR 05 / [facility] OR    Surgeons: Donny Carrizales MD            Relevant Problems   Cardiac   (+) Essential hypertension   (+) Hyperlipidemia      Neuro   (+) Anxiety with flying      GI   (+) Gastroesophageal reflux disease      Musculoskeletal   (+) Osteoarthritis, hip, bilateral       Clinical information reviewed:   Tobacco  Allergies  Meds  Problems  Med Hx  Surg Hx   Fam Hx          NPO Detail:  NPO/Void Status  Date of Last Liquid: 03/18/25  Time of Last Liquid: 1900  Date of Last Solid: 03/18/25  Time of Last Solid: 1800         Physical Exam    Airway  Mallampati: II  TM distance: >3 FB  Neck ROM: full     Cardiovascular   Rhythm: irregular  Rate: normal     Dental - normal exam     Pulmonary   Breath sounds clear to auscultation  (+) decreased breath sounds     Abdominal - normal exam  Abdomen: soft  Bowel sounds: normal           Anesthesia Plan    History of general anesthesia?: yes  History of complications of general anesthesia?: no    ASA 3     general     The patient is not a current smoker.  Patient was previously instructed to abstain from smoking on day of procedure.  Patient did not smoke on day of procedure.  Education provided regarding risk of obstructive sleep apnea.  intravenous induction   Postoperative administration of opioids is intended.  Anesthetic plan and risks discussed with patient.  Use of blood products discussed with patient who consented to blood products.    Plan discussed with CAA.

## 2025-03-19 NOTE — DISCHARGE INSTRUCTIONS
OK to shower. Do not immerse incisions underwater until completely healed.     No heavy lifting > 10 lbs for 6 weeks.     There may be some scrotal or groin swelling. Elevate scrotum and apply ice as needed.

## 2025-03-19 NOTE — OP NOTE
REPAIR, HERNIA, INGUINAL, LAPAROSCOPIC (B) Operative Note     Date: 3/19/2025  OR Location: [facility] OR    Name: Soren Herrera, : 1945, Age: 79 y.o., MRN: [id number], Sex: male    Diagnosis  Pre-op Diagnosis      * Right inguinal hernia [K40.90] Post-op Diagnosis     * Non-recurrent bilateral inguinal hernia without obstruction or gangrene [K40.20]     Procedures  REPAIR, HERNIA, INGUINAL, LAPAROSCOPIC  [id number] - HI LAPAROSCOPY SURG RPR INITIAL INGUINAL HERNIA      Surgeons      * Donny Carrizales - Primary    Resident/Fellow/Other Assistant:  Surgeons and Role:  * No surgeons found with a matching role *    Staff:   Scrub Person:   Circulator: Gaye  Circulator: Tate  Circulator: Chucky  Surgical Assistant: Wilma    Anesthesia Staff: Anesthesiologist: Cydney Lilly MD  C-AA: CELINE Black    Procedure Summary  Anesthesia: General  ASA: ASA status not filed in the log.  Estimated Blood Loss: 25 mL  Intra-op Medications:   Administrations occurring from 0730 to 1005 on 25:   Medication Name Total Dose   bupivacaine PF 0.25 % (Marcaine) 0.25 % (2.5 mg/mL) injection 20 mL   dexAMETHasone (Decadron) injection 4 mg/mL 8 mg   fentaNYL (Sublimaze) injection 50 mcg/mL 100 mcg   glycopyrrolate (Robinul) injection 1 mg   HYDROmorphone (Dilaudid) injection 1 mg/mL 0.5 mg   LR infusion Cannot be calculated   lidocaine PF (Xylocaine-MPF) local injection 2 % 60 mg   neostigmine (Bloxiverz) 10 mg/10 mL injection 5 mg   ondansetron (Zofran) 2 mg/mL injection 4 mg   phenylephrine 100 mcg/mL syringe 10 mL (prefilled) 350 mcg   propofol (Diprivan) injection 10 mg/mL 200 mg   rocuronium (Zemuron) 50 mg/5 mL prefilled syringe 70 mg   succinylcholine chloride injection syringe 100 mg/5 mL 100 mg   clindamycin (Cleocin) 900 mg in dextrose 5% IV 50 mL 900 mg              Anesthesia Record               Intraprocedure I/O Totals          Intake    clindamycin (Cleocin) 900 mg in dextrose 5% IV 50 mL 50.00 mL    Total Intake 50  mL          Specimen: No specimens collected              Drains and/or Catheters: * None in log *    Tourniquet Times:         Implants:  Implants       Type Name Action Serial No.      Surgical Mesh Sling Implant PATCH, MESH, MARLEX, 3 X 6 IN, POLYPROPYLENE - [id number] Implanted      Surgical Mesh Sling Implant PATCH, MESH, MARLEX, 3 X 6 IN, POLYPROPYLENE - [id number] Implanted               Findings: bilateral indirect inguinal hernias.     Indications: Soren Herrera is an 79 y.o. male who is having surgery for Right inguinal hernia [K40.90].     The patient was seen in the preoperative area. The risks, benefits, complications, treatment options, non-operative alternatives, expected recovery and outcomes were discussed with the patient. The possibilities of reaction to medication, pulmonary aspiration, injury to surrounding structures, bleeding, recurrent infection, the need for additional procedures, failure to diagnose a condition, and creating a complication requiring transfusion or operation were discussed with the patient. The patient concurred with the proposed plan, giving informed consent.  The site of surgery was properly noted/marked if necessary per policy. The patient has been actively warmed in preoperative area. Preoperative antibiotics have been ordered and given within 1 hours of incision. Venous thrombosis prophylaxis have been ordered including bilateral sequential compression devices and chemical prophylaxis    Procedure Details: The patient was taken to the operating room. A presurgical huddle was completed which included the patient's identifiers, consent, procedure, and equipment. The patient was placed in the supine position and general endotracheal anesthesia was induced. The patient was prepped and draped in the normal sterile fashion.      A surgical time out was performed prior to incision. A supraumbilical incision was made and dissection was carried down to the umbilical stalk with  cautery. This was elevated and the midline fascia was incised sharply. Blunt dissection was used to enter the peritoneal cavity. A 0 Vicryl figure of eight stitch was placed in the fascia and a 12 mm Kiara trocar was inserted into the abdomen. The abdomen was insufflated to 15 mmHg. The scope was inserted and confirmed no injuries to underlying structures. Two 5 mm trocars were placed in the left and right midclavicular line at the level of the umbilicus. The patient was placed in Trendelenburg.      There was evidence of bilateral indirect hernias. An incision was first made in the right anterior peritoneal wall 5 cm cephalad to the visualized hernia. The peritoneum was peeled away from the myopectineal orifice and the hernia sac was fully reduced. The Vas Deferens was visualized and protected along with the gonadal vessels by bluntly peeling away the sac from these structures. There was a moderate sized cord lipoma which was fully reduced. The femoral canal was visualized and no hernia was appreciated. The dissection was continued out laterally peeling peritoneum away from the abdominal wall toward the anterior superior iliac spine to create room for the mesh. A 7.6 cm x 15 cm polypropylene mesh was placed to cover all potential hernia sites, centered on the visualized defect. The tacker was used to place a single tack on the pubic tubercle and two tacks laterally and superiorly to fixate the mesh. The peritoneal flap was then closed over the mesh using tacks.     An incision was then made in the left anterior peritoneal wall 5 cm cephalad to the visualized hernia. The peritoneum was peeled away from the myopectineal orifice and the hernia sac was fully reduced. The Vas Deferens was visualized and protected along with the gonadal vessels by bluntly peeling away the sac from these structures. The femoral canal was visualized and no hernia was appreciated. The dissection was continued out laterally peeling  peritoneum away from the abdominal wall toward the anterior superior iliac spine to create room for the mesh. A 7.6 cm x 15 cm polypropylene mesh was placed to cover all potential hernia sites, centered on the visualized defect. The tacker was used to place a single tack on the pubic tubercle and two tacks laterally and superiorly to fixate the mesh. The peritoneal flap was then closed over the mesh using tacks.    All counts were correct. The abdomen was then desufflated and all ports were removed under direct visualization. 20 cc of 0.5% Marcaine was injected into the port sites. The supraumbilical port site fascia was closed with the previously placed 0 Vicryl. The port sites were closed with 4-0 Monocryl and covered with skin adhesive.      All surgical counts were again correct. The patient tolerated the procedure well and was transferred to the PACU in stable condition.     Complications:  None; patient tolerated the procedure well.    Disposition: PACU - hemodynamically stable.  Condition: stable         Task Performed by RNFA or Surgical Assistant:  Port placement  Camera driving  Port closure      Additional Details:     Attending Attestation: A qualified resident physician was not available.    Donny Carrizales  Phone Number: [phone]

## 2025-03-19 NOTE — ANESTHESIA PROCEDURE NOTES
Airway  Date/Time: 3/19/2025 7:43 AM  Urgency: elective    Airway not difficult    Staffing  Performed: CELINE   Authorized by: Cydney Lilly MD    Performed by: CELINE Black  Patient location during procedure: OR    Indications and Patient Condition  Indications for airway management: anesthesia  Spontaneous ventilation: present  Sedation level: deep  Preoxygenated: yes  Patient position: sniffing  Mask difficulty assessment: 1 - vent by mask    Final Airway Details  Final airway type: endotracheal airway      Successful airway: ETT     Successful intubation technique: direct laryngoscopy  Facilitating devices/methods: intubating stylet  Blade: Elissa  Blade size: #4  ETT size (mm): 7.5  Cormack-Lehane Classification: grade I - full view of glottis  Placement verified by: chest auscultation and capnometry   Measured from: gums  ETT to gums (cm): 24  Number of attempts at approach: 1

## 2025-04-01 ENCOUNTER — APPOINTMENT (OUTPATIENT)
Dept: SURGERY | Facility: CLINIC | Age: 80
End: 2025-04-01
Payer: MEDICARE

## 2025-04-08 ENCOUNTER — APPOINTMENT (OUTPATIENT)
Dept: SURGERY | Facility: CLINIC | Age: 80
End: 2025-04-08
Payer: MEDICARE

## 2025-04-08 VITALS
DIASTOLIC BLOOD PRESSURE: 85 MMHG | SYSTOLIC BLOOD PRESSURE: 158 MMHG | OXYGEN SATURATION: 95 % | HEART RATE: 74 BPM | RESPIRATION RATE: 16 BRPM | TEMPERATURE: 97.6 F

## 2025-04-08 DIAGNOSIS — K40.20 NON-RECURRENT BILATERAL INGUINAL HERNIA WITHOUT OBSTRUCTION OR GANGRENE: Primary | ICD-10-CM

## 2025-04-08 DIAGNOSIS — Z09 POSTOPERATIVE EXAMINATION: ICD-10-CM

## 2025-04-08 PROCEDURE — 3077F SYST BP >= 140 MM HG: CPT | Performed by: SURGERY

## 2025-04-08 PROCEDURE — 99024 POSTOP FOLLOW-UP VISIT: CPT | Performed by: SURGERY

## 2025-04-08 PROCEDURE — 1036F TOBACCO NON-USER: CPT | Performed by: SURGERY

## 2025-04-08 PROCEDURE — 1159F MED LIST DOCD IN RCRD: CPT | Performed by: SURGERY

## 2025-04-08 PROCEDURE — 3079F DIAST BP 80-89 MM HG: CPT | Performed by: SURGERY

## 2025-04-08 PROCEDURE — 1123F ACP DISCUSS/DSCN MKR DOCD: CPT | Performed by: SURGERY

## 2025-04-08 ASSESSMENT — ENCOUNTER SYMPTOMS
RESPIRATORY NEGATIVE: 1
CONSTITUTIONAL NEGATIVE: 1
MUSCULOSKELETAL NEGATIVE: 1
GASTROINTESTINAL NEGATIVE: 1
HEMATOLOGIC/LYMPHATIC NEGATIVE: 1
ALLERGIC/IMMUNOLOGIC NEGATIVE: 1
NEUROLOGICAL NEGATIVE: 1
ENDOCRINE NEGATIVE: 1
CARDIOVASCULAR NEGATIVE: 1
PSYCHIATRIC NEGATIVE: 1

## 2025-04-08 NOTE — PROGRESS NOTES
Subjective   Patient ID: Soren Herrera is a 79 y.o. male who presents for Post-op (POV_ RI 3/19).  Patient reports doing well since surgery. Denies any pain or nausea. Ambulating well. Keeping to lifting restrictions. Having normal bowel function and urinating without issues.         Review of Systems   Constitutional: Negative.    HENT: Negative.     Respiratory: Negative.     Cardiovascular: Negative.    Gastrointestinal: Negative.    Endocrine: Negative.    Genitourinary: Negative.    Musculoskeletal: Negative.    Skin: Negative.    Allergic/Immunologic: Negative.    Neurological: Negative.    Hematological: Negative.    Psychiatric/Behavioral: Negative.         Objective   Physical Exam  Constitutional:       Appearance: Normal appearance.   HENT:      Head: Normocephalic and atraumatic.   Cardiovascular:      Rate and Rhythm: Normal rate and regular rhythm.   Pulmonary:      Effort: Pulmonary effort is normal.      Breath sounds: Normal breath sounds.   Abdominal:      General: Abdomen is flat. There is no distension.      Palpations: Abdomen is soft.      Tenderness: There is no abdominal tenderness.      Comments: Incisions healing well.    Genitourinary:     Comments: Bilateral groins soft, non-tender.   Musculoskeletal:         General: Normal range of motion.   Skin:     General: Skin is warm and dry.   Neurological:      General: No focal deficit present.      Mental Status: He is alert and oriented to person, place, and time.   Psychiatric:         Mood and Affect: Mood normal.         Behavior: Behavior normal.         Assessment/Plan   Diagnoses and all orders for this visit:  Non-recurrent bilateral inguinal hernia without obstruction or gangrene  - Continue lifting restrictions until 6 weeks post-op then gradually increase activity back to normal.          Donny Carrizales MD   General Surgery   04/08/25 1:14 PM

## 2025-04-08 NOTE — PROGRESS NOTES
Subjective   Patient ID: Soren Herrera is a 79 y.o. male who presents for Post-op (POV_ [facility] 3/19).  HPI    Review of Systems    Objective   Physical Exam    Assessment/Plan            Donny Carrizales MD 04/08/25 1:15 PM

## 2025-04-09 ENCOUNTER — APPOINTMENT (OUTPATIENT)
Dept: INTEGRATIVE MEDICINE | Facility: CLINIC | Age: 80
End: 2025-04-09
Payer: MEDICARE

## 2025-04-18 ENCOUNTER — ALLIED HEALTH (OUTPATIENT)
Dept: INTEGRATIVE MEDICINE | Facility: CLINIC | Age: 80
End: 2025-04-18
Payer: MEDICARE

## 2025-04-18 DIAGNOSIS — M99.03 SOMATIC DYSFUNCTION OF LUMBAR REGION: ICD-10-CM

## 2025-04-18 DIAGNOSIS — M99.02 SOMATIC DYSFUNCTION OF THORACIC REGION: Primary | ICD-10-CM

## 2025-04-18 DIAGNOSIS — M54.50 CHRONIC BILATERAL LOW BACK PAIN WITHOUT SCIATICA: ICD-10-CM

## 2025-04-18 DIAGNOSIS — G89.29 CHRONIC BILATERAL LOW BACK PAIN WITHOUT SCIATICA: ICD-10-CM

## 2025-04-18 DIAGNOSIS — M47.816 LUMBAR SPONDYLOSIS: ICD-10-CM

## 2025-04-18 DIAGNOSIS — M99.05 SOMATIC DYSFUNCTION OF PELVIS REGION: ICD-10-CM

## 2025-04-18 PROCEDURE — 98941 CHIROPRACT MANJ 3-4 REGIONS: CPT | Performed by: CHIROPRACTOR

## 2025-04-18 ASSESSMENT — ENCOUNTER SYMPTOMS
DIZZINESS: 0
COLOR CHANGE: 0
FEVER: 0
DYSURIA: 0
AGITATION: 0
DIFFICULTY URINATING: 0
DIARRHEA: 0
SHORTNESS OF BREATH: 0
VOMITING: 0
NAUSEA: 0

## 2025-04-18 NOTE — PROGRESS NOTES
Assessment/Plan   Patient's low back pain consistent with facet joint related pain given his lack of range of motion and inability to extend fully. There are no red flags or signs of nerve root involvement or no neurologic deficits present. Treatment will involve soft tissue and spinal manipulation and stretching of the hamstrings. Encouraged patient to do flexion-based lumbar stretches 2-3x/daily. Will add back dry needling as patient noted benefit with this treatment. Has R THR.    XR R hip 2025 - IMPRESSION:  No hardware failure about the right total hip arthroplasty    LS XR 2022 - Suspected 2 cm sizable right renal calculus.  IMPRESSION:  Fairly advanced multilevel lumbar degenerative change.    No spine imaging results found for the past 12 months     Visits this year: 1    Subjective     HPI - LBP 4/18/25 - Patient presents for first visit since 2022 noting return of low back pain and was previously treated in our office for similar symptoms. patient reports moderate to severe stiffness in the lower back upon waking which gradually improves over the subsequent 10 to 15 minutes and then pain & stiffness is typically mild to moderate through the rest of the day.  Since her last visit in 2022 he had right total hip arthroplasty which went quite well did require some postop rehab but now he is doing much better and is walking his son's dog 3-4 times per week for exercise.  He is not superactive aside from that.  He notes that the back pain is worse in the morning and then gets better through the day.  He felt that it was feeling better after he was seeing me in 2022 and over the past few months his symptoms have returned insidiously      Review of Systems   Constitutional:  Negative for fever.   Eyes:  Negative for visual disturbance.   Respiratory:  Negative for shortness of breath.    Cardiovascular:  Negative for chest pain.   Gastrointestinal:  Negative for diarrhea, nausea and vomiting.   Genitourinary:   Negative for difficulty urinating and dysuria.   Skin:  Negative for color change.   Neurological:  Negative for dizziness.   Psychiatric/Behavioral:  Negative for agitation.    All other systems reviewed and are negative.      Objective   Examination findings (e.g., palpation & ROM):   Dec LS ROM w/ pain  HTN/tender LS erectors, BL QL  SLR BL 40 with tightness    Segmental joint dysfunction was identified in the following areas using motion palpation and/or pain provocation assessment:  Cervical:   Thoracic: 5-9  Lumbopelvic: 1-2 BL SIJ      Physical Exam  Neurological:      Mental Status: He is alert.      Sensory: Sensation is intact.      Motor: Motor function is intact.      Coordination: Coordination is intact.      Gait: Gait is intact.      Deep Tendon Reflexes:      Reflex Scores:       Patellar reflexes are 2+ on the right side and 2+ on the left side.       Achilles reflexes are 2+ on the right side and 2+ on the left side.     Comments: 4/18/25       Plan   Today's treatment:  SMT to regions of segmental dysfunction identified on exam, using age-appropriate force, and manual diversified technique.   STM to patient tolerance to hypertonic paraspinal muscles  Manual dynamic stretching of the gluteal muscles, hamstrings 5m  Patient noted improved mobility and reduced pain post-treatment    Treatment Plan:   The patient and I discussed the risks and benefits of chiropractic care. Based on the patient's subjective complaints along with the examination findings, it is advised that a course of chiropractic treatment be initiated. The patient provided consent for care. The patient tolerated today's treatment with little or no additional discomfort and was instructed to contact the office for questions or concerns. Will see patient once per week then every 2 weeks when symptoms become mild/manageable, further spaced apart contingent upon improvement.     This chart note was generated using dictation software, and  as such, there may be typographical errors present. Abbreviations: Cervical spine (CS), cervical-thoracic (CT), Dry needling (DN), Flexion adduction internal rotation (FAIR), high velocity, low amplitude (HVLA), Lumbar spine (LS), Soft tissue manipulation (STM), spinal manipulative therapy (SMT), Straight leg raise (SLR), Thoracic spine (TS).

## 2025-05-01 ASSESSMENT — ENCOUNTER SYMPTOMS
DYSURIA: 0
DIZZINESS: 0
COLOR CHANGE: 0
VOMITING: 0
SHORTNESS OF BREATH: 0
AGITATION: 0
FEVER: 0
DIFFICULTY URINATING: 0
DIARRHEA: 0
NAUSEA: 0

## 2025-05-01 NOTE — PROGRESS NOTES
Assessment/Plan   Patient's low back pain consistent with facet joint related pain given his lack of range of motion and inability to extend fully. There are no red flags or signs of nerve root involvement or no neurologic deficits present. Treatment will involve soft tissue and spinal manipulation and stretching of the hamstrings. Encouraged patient to do flexion-based lumbar stretches 2-3x/daily. May add back dry needling as patient noted benefit with this treatment, per patient preference. Has R THR.    XR R hip 2025 - IMPRESSION:  No hardware failure about the right total hip arthroplasty    LS XR 2022 - Suspected 2 cm sizable right renal calculus.  IMPRESSION:  Fairly advanced multilevel lumbar degenerative change.    No spine imaging results found for the past 12 months     Visits this year: 2    Subjective   Patient reports improvement in low back pain since last visit twisting 3-4 out of 10 intensity localized low back pain and tightness which is frequent without radiation numbness or tingling in the lower extremities.  Has been walking a dog for exercise.  Notes that he wore a back brace while sleeping and it helped alleviate his pain upon waking in the morning.    HPI - LBP 4/18/25 - Patient presents for first visit since 2022 noting return of low back pain and was previously treated in our office for similar symptoms. patient reports moderate to severe stiffness in the lower back upon waking which gradually improves over the subsequent 10 to 15 minutes and then pain & stiffness is typically mild to moderate through the rest of the day.  Since her last visit in 2022 he had right total hip arthroplasty which went quite well did require some postop rehab but now he is doing much better and is walking his son's dog 3-4 times per week for exercise.  He is not superactive aside from that.  He notes that the back pain is worse in the morning and then gets better through the day.  He felt that it was feeling  better after he was seeing me in 2022 and over the past few months his symptoms have returned insidiously    Review of Systems   Constitutional:  Negative for fever.   Eyes:  Negative for visual disturbance.   Respiratory:  Negative for shortness of breath.    Cardiovascular:  Negative for chest pain.   Gastrointestinal:  Negative for diarrhea, nausea and vomiting.   Genitourinary:  Negative for difficulty urinating and dysuria.   Skin:  Negative for color change.   Neurological:  Negative for dizziness.   Psychiatric/Behavioral:  Negative for agitation.    All other systems reviewed and are negative.    Objective   Examination findings (e.g., palpation & ROM):   Dec LS ROM w/ pain  HTN/tender LS erectors, BL QL  SLR BL 45 with tightness    Segmental joint dysfunction was identified in the following areas using motion palpation and/or pain provocation assessment:  Cervical:   Thoracic: 5-9  Lumbopelvic: 1-2 BL SIJ      Physical Exam  Neurological:      Mental Status: He is alert.      Sensory: Sensation is intact.      Motor: Motor function is intact.      Coordination: Coordination is intact.      Gait: Gait is intact.      Deep Tendon Reflexes:      Reflex Scores:       Patellar reflexes are 2+ on the right side and 2+ on the left side.       Achilles reflexes are 2+ on the right side and 2+ on the left side.     Comments: 4/18/25         Plan   Today's treatment:  SMT to regions of segmental dysfunction identified on exam, using age-appropriate force, and manual diversified technique.   STM to patient tolerance to hypertonic paraspinal muscles  Manual dynamic stretching of the gluteal muscles, hamstrings 5m  Patient noted improved mobility and reduced pain post-treatment    Treatment Plan:   The patient and I discussed the risks and benefits of chiropractic care. Based on the patient's subjective complaints along with the examination findings, it is advised that a course of chiropractic treatment be initiated.  The patient provided consent for care. The patient tolerated today's treatment with little or no additional discomfort and was instructed to contact the office for questions or concerns. Will see patient once per week then every 2 weeks when symptoms become mild/manageable, further spaced apart contingent upon improvement.     This chart note was generated using dictation software, and as such, there may be typographical errors present. Abbreviations: Cervical spine (CS), cervical-thoracic (CT), Dry needling (DN), Flexion adduction internal rotation (FAIR), high velocity, low amplitude (HVLA), Lumbar spine (LS), Soft tissue manipulation (STM), spinal manipulative therapy (SMT), Straight leg raise (SLR), Thoracic spine (TS).

## 2025-05-02 ENCOUNTER — APPOINTMENT (OUTPATIENT)
Dept: INTEGRATIVE MEDICINE | Facility: CLINIC | Age: 80
End: 2025-05-02
Payer: MEDICARE

## 2025-05-02 DIAGNOSIS — M47.816 LUMBAR SPONDYLOSIS: ICD-10-CM

## 2025-05-02 DIAGNOSIS — M99.03 SOMATIC DYSFUNCTION OF LUMBAR REGION: ICD-10-CM

## 2025-05-02 DIAGNOSIS — M99.05 SOMATIC DYSFUNCTION OF PELVIS REGION: ICD-10-CM

## 2025-05-02 DIAGNOSIS — M99.02 SOMATIC DYSFUNCTION OF THORACIC REGION: Primary | ICD-10-CM

## 2025-05-02 DIAGNOSIS — M54.50 CHRONIC BILATERAL LOW BACK PAIN WITHOUT SCIATICA: ICD-10-CM

## 2025-05-02 DIAGNOSIS — G89.29 CHRONIC BILATERAL LOW BACK PAIN WITHOUT SCIATICA: ICD-10-CM

## 2025-05-02 PROCEDURE — 98941 CHIROPRACT MANJ 3-4 REGIONS: CPT | Performed by: CHIROPRACTOR

## 2025-05-08 ENCOUNTER — APPOINTMENT (OUTPATIENT)
Dept: PRIMARY CARE | Facility: CLINIC | Age: 80
End: 2025-05-08
Payer: MEDICARE

## 2025-05-12 ENCOUNTER — TELEPHONE (OUTPATIENT)
Dept: PRIMARY CARE | Facility: CLINIC | Age: 80
End: 2025-05-12
Payer: MEDICARE

## 2025-05-15 ENCOUNTER — OFFICE VISIT (OUTPATIENT)
Dept: PRIMARY CARE | Facility: CLINIC | Age: 80
End: 2025-05-15
Payer: MEDICARE

## 2025-05-15 VITALS
HEART RATE: 101 BPM | WEIGHT: 165 LBS | BODY MASS INDEX: 24.44 KG/M2 | DIASTOLIC BLOOD PRESSURE: 87 MMHG | TEMPERATURE: 97.2 F | HEIGHT: 69 IN | SYSTOLIC BLOOD PRESSURE: 136 MMHG

## 2025-05-15 DIAGNOSIS — I10 ESSENTIAL HYPERTENSION: ICD-10-CM

## 2025-05-15 DIAGNOSIS — L98.9 SKIN LESION OF FACE: ICD-10-CM

## 2025-05-15 DIAGNOSIS — Z23 NEED FOR VACCINATION: ICD-10-CM

## 2025-05-15 DIAGNOSIS — M51.360 DEGENERATION OF INTERVERTEBRAL DISC OF LUMBAR REGION WITH DISCOGENIC BACK PAIN: ICD-10-CM

## 2025-05-15 DIAGNOSIS — I70.90 ATHEROSCLEROSIS: ICD-10-CM

## 2025-05-15 DIAGNOSIS — F51.01 PRIMARY INSOMNIA: ICD-10-CM

## 2025-05-15 DIAGNOSIS — Z00.00 ROUTINE GENERAL MEDICAL EXAMINATION AT HEALTH CARE FACILITY: Primary | ICD-10-CM

## 2025-05-15 DIAGNOSIS — E78.5 HYPERLIPIDEMIA, UNSPECIFIED HYPERLIPIDEMIA TYPE: ICD-10-CM

## 2025-05-15 PROBLEM — Z96.1 PRESENCE OF INTRAOCULAR LENS: Status: ACTIVE | Noted: 2025-05-15

## 2025-05-15 RX ORDER — IRBESARTAN AND HYDROCHLOROTHIAZIDE 150; 12.5 MG/1; MG/1
1 TABLET, FILM COATED ORAL DAILY
Qty: 90 TABLET | Refills: 0 | Status: SHIPPED | OUTPATIENT
Start: 2025-05-15 | End: 2026-05-15

## 2025-05-15 RX ORDER — RAMELTEON 8 MG/1
8 TABLET ORAL NIGHTLY
Qty: 90 TABLET | Refills: 1 | Status: SHIPPED | OUTPATIENT
Start: 2025-05-15 | End: 2026-05-15

## 2025-05-15 RX ORDER — ROSUVASTATIN CALCIUM 10 MG/1
10 TABLET, COATED ORAL DAILY
Qty: 90 TABLET | Refills: 3 | Status: SHIPPED | OUTPATIENT
Start: 2025-05-15 | End: 2026-05-10

## 2025-05-15 ASSESSMENT — ACTIVITIES OF DAILY LIVING (ADL)
BATHING: INDEPENDENT
DOING_HOUSEWORK: INDEPENDENT
GROCERY_SHOPPING: INDEPENDENT
DRESSING: INDEPENDENT
TAKING_MEDICATION: INDEPENDENT
MANAGING_FINANCES: INDEPENDENT

## 2025-05-15 ASSESSMENT — ENCOUNTER SYMPTOMS
DIFFICULTY URINATING: 0
NAUSEA: 0
DIZZINESS: 0
DIARRHEA: 0
VOMITING: 0
COLOR CHANGE: 0
AGITATION: 0
SHORTNESS OF BREATH: 0
FEVER: 0
DYSURIA: 0

## 2025-05-15 NOTE — PROGRESS NOTES
Subjective   Reason for Visit: Soren Herrera is an 79 y.o. male here for a Medicare Wellness visit.     Past Medical, Surgical, and Family History reviewed and updated in chart.  Medicare Wellness Billing Compliance Satisfied    *This is a visual tool to show completion of required items on the day of the visit. Green checks will only appear on the date of visit.    Review all medications by prescribing practitioner or clinical pharmacist (such as prescriptions, OTCs, herbal therapies and supplements) documented in the medical record    Past Medical, Surgical, and Family History reviewed and updated in chart    Tobacco Use Reviewed    Alcohol Use Reviewed    Illicit Drug Use Reviewed    PHQ2/9    Falls in Last Year Reviewed    Home Safety Risk Factors Reviewed    Cognitive Impairment Reviewed    Patient Self Assessment and Health Status    Current Diet Reviewed    Exercise Frequency    ADL - Hearing Impairment    ADL - Bathing    ADL - Dressing    ADL - Walks in Home    IADL - Managing Finances    IADL - Grocery Shopping    IADL - Taking Medications    IADL - Doing Housework      Reviewed all medications by prescribing practitioner or clinical pharmacist (such as prescriptions, OTCs, herbal therapies and supplements) and documented in the medical record.    HPI  Nutrition: frequent carryout; tries to make healthier choices. Scarce veggies. Occasional fruiits.   Exercise: minimal; occasional walking dog  Sleep: improved sleep with rozerem, 6-7 hrs/n  Eye: utd  Dental: due    HTN-readings fluctuate, home readings often with systolic pressures greater than 140 and diastolic pressures in the 90s.  Pressures have been elevated despite additional 10 mg of lisinopril.  Denies headaches, lightheadedness, dizziness.  Low back pain-frequent aching and low back, without radiation to lower extremities.  No associated lower extremity numbness, tingling, weakness.  Seeing chiropractor routinely, no sustained  "relief.  Hyperlipidemia-stable on statin.  Patient Care Team:  Tete Arguello MD as PCP - General  Tete Arguello MD as PCP - [employer] PCP     Review of Systems   Constitutional:  Negative for fatigue and unexpected weight change.   Respiratory:  Negative for chest tightness and shortness of breath.    Cardiovascular:  Negative for chest pain and palpitations.   Gastrointestinal:  Negative for abdominal pain.   Musculoskeletal:  Positive for back pain.   Skin:  Positive for color change (Pinkish scaly lesion right temple).   Neurological:  Negative for dizziness, weakness, light-headedness, numbness and headaches.       Objective   Vitals:  /87 (BP Location: Left arm, Patient Position: Sitting)   Pulse 101   Temp 36.2 °C (97.2 °F)   Ht 1.753 m (5' 9\")   Wt 74.8 kg (165 lb)   BMI 24.37 kg/m²       Physical Exam  Vitals reviewed.   Constitutional:       General: He is not in acute distress.     Appearance: Normal appearance. He is normal weight. He is not ill-appearing.   HENT:      Head: Normocephalic and atraumatic.      Mouth/Throat:      Mouth: Mucous membranes are moist.   Eyes:      Extraocular Movements: Extraocular movements intact.      Conjunctiva/sclera: Conjunctivae normal.   Cardiovascular:      Rate and Rhythm: Normal rate and regular rhythm.      Pulses: Normal pulses.      Heart sounds: Normal heart sounds. No murmur heard.  Pulmonary:      Effort: Pulmonary effort is normal. No respiratory distress.      Breath sounds: Normal breath sounds.   Abdominal:      General: Bowel sounds are normal.      Palpations: Abdomen is soft.      Tenderness: There is no abdominal tenderness.   Musculoskeletal:      Cervical back: Neck supple.      Lumbar back: Tenderness and bony tenderness present. Negative right straight leg raise test and negative left straight leg raise test.        Back:       Right lower leg: No edema.      Left lower leg: No edema.   Lymphadenopathy:      " Cervical: No cervical adenopathy.   Skin:     General: Skin is warm and dry.      Findings: Lesion present.      Comments: Pinkish scaly lesion right temple region   Neurological:      General: No focal deficit present.      Mental Status: He is alert.   Psychiatric:         Mood and Affect: Mood normal.         Behavior: Behavior normal.         Thought Content: Thought content normal.         Judgment: Judgment normal.       Lab Results   Component Value Date    WBC 9.4 12/04/2024    HGB 14.6 12/04/2024    HCT 42.0 12/04/2024     12/04/2024    CHOL 156 12/04/2024    TRIG 109 12/04/2024    HDL 53.0 12/04/2024    ALT 23 12/04/2024    AST 24 12/04/2024     03/11/2025    K 3.2 (L) 03/11/2025     03/11/2025    CREATININE 0.81 03/11/2025    BUN 8 03/11/2025    CO2 31 03/11/2025    TSH 1.58 12/04/2024    PSA 3.10 10/07/2020    INR 1.0 02/09/2022     Reviewed x-ray of lumbar spine from October 2022 notable for advanced multilevel lumbar degenerative changes  Assessment & Plan  Degeneration of intervertebral disc of lumbar region with discogenic back pain    Orders:    Referral to Physical Therapy; Future    Routine general medical examination at health care facility  Discussed strategies to improve nutrition.  Stressed the importance of regular physical activity with strength and weightbearing exercise  Reviewed recommended vaccines continue statin  Orders:    1 Year Follow Up In Primary Care - Wellness Exam; Future    Need for vaccination    Orders:    Flu vaccine, high dose; Future    Atherosclerosis  Continue statin  Orders:    rosuvastatin (Crestor) 10 mg tablet; Take 1 tablet (10 mg) by mouth once daily.    Hyperlipidemia, unspecified hyperlipidemia type    Orders:    rosuvastatin (Crestor) 10 mg tablet; Take 1 tablet (10 mg) by mouth once daily.    Primary insomnia  Continue Rozerem  Orders:    ramelteon (Rozerem) 8 mg tablet; Take 1 tablet (8 mg) by mouth once daily at bedtime.    Essential  hypertension  Suboptimal BP control, transition to irbesartan-HCTZ, with up-titration of dosing to achieve optimal BP control  Orders:    irbesartan-hydrochlorothiazide (Avalide) 150-12.5 mg tablet; Take 1 tablet by mouth once daily.    Basic metabolic panel; Future    Skin lesion of face  Recommend dermatology consult for full skin exam  Orders:    Referral to Dermatology

## 2025-05-15 NOTE — ASSESSMENT & PLAN NOTE
Continue statin  Orders:    rosuvastatin (Crestor) 10 mg tablet; Take 1 tablet (10 mg) by mouth once daily.

## 2025-05-15 NOTE — PROGRESS NOTES
Assessment/Plan   Patient's low back pain consistent with facet joint related pain given his lack of range of motion and inability to extend fully. There are no red flags or signs of nerve root involvement or no neurologic deficits present. Treatment will involve soft tissue and spinal manipulation and stretching of the hamstrings. Encouraged patient to do flexion-based lumbar stretches 2-3x/daily. May add back dry needling as patient noted benefit with this treatment, per patient preference. Has R THR. Could update LS radiographs if needed pending trial of care and PT.    XR R hip 2025 - IMPRESSION:  No hardware failure about the right total hip arthroplasty    LS XR 2022 - Suspected 2 cm sizable right renal calculus.  IMPRESSION:  Fairly advanced multilevel lumbar degenerative change.    No spine imaging results found for the past 12 months     Visits this year: 3    Subjective   Patient reports that he felt much better after last visit but since that time the pain has fluctuated up and down.  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  This is much more physical activity than he has done in the past several months and notes that after doing this he is a bit sore in the back from all the bending over and pain is mild when he is not doing this but can be moderate after a baseball session. Is going to schedule with PT.    HPI - LBP 4/18/25 - Patient presents for first visit since 2022 noting return of low back pain and was previously treated in our office for similar symptoms. patient reports moderate to severe stiffness in the lower back upon waking which gradually improves over the subsequent 10 to 15 minutes and then pain & stiffness is typically mild to moderate through the rest of the day.  Since her last visit in 2022 he had right total hip arthroplasty which went quite  well did require some postop rehab but now he is doing much better and is walking his son's dog 3-4 times per week for exercise.  He is not superactive aside from that.  He notes that the back pain is worse in the morning and then gets better through the day.  He felt that it was feeling better after he was seeing me in 2022 and over the past few months his symptoms have returned insidiously    Review of Systems   Constitutional:  Negative for fever.   Eyes:  Negative for visual disturbance.   Respiratory:  Negative for shortness of breath.    Cardiovascular:  Negative for chest pain.   Gastrointestinal:  Negative for diarrhea, nausea and vomiting.   Genitourinary:  Negative for difficulty urinating and dysuria.   Skin:  Negative for color change.   Neurological:  Negative for dizziness.   Psychiatric/Behavioral:  Negative for agitation.    All other systems reviewed and are negative.    Objective   Examination findings (e.g., palpation & ROM):   Dec LS ROM w/ pain  HTN/tender LS erectors, BL QL  SLR BL 50 with tightness    Segmental joint dysfunction was identified in the following areas using motion palpation and/or pain provocation assessment:  Cervical:   Thoracic: 5-9  Lumbopelvic: 1-2 BL SIJ      Physical Exam  Neurological:      Mental Status: He is alert.      Sensory: Sensation is intact.      Motor: Motor function is intact.      Coordination: Coordination is intact.      Gait: Gait is intact.      Deep Tendon Reflexes:      Reflex Scores:       Patellar reflexes are 2+ on the right side and 2+ on the left side.       Achilles reflexes are 2+ on the right side and 2+ on the left side.     Comments: 4/18/25       Plan   Today's treatment:  SMT to regions of segmental dysfunction identified on exam, using age-appropriate force, and manual diversified technique.   STM to patient tolerance to hypertonic paraspinal muscles  Dry needling (10 in, 10 out) to region of the chief complaint / hypertonic muscles  identified upon palpation in LS erectors  Manual dynamic stretching of the gluteal muscles, hamstrings 5m  Patient noted improved mobility and reduced pain post-treatment    Treatment Plan:   The patient and I discussed the risks and benefits of chiropractic care. Based on the patient's subjective complaints along with the examination findings, it is advised that a course of chiropractic treatment be initiated. The patient provided consent for care. The patient tolerated today's treatment with little or no additional discomfort and was instructed to contact the office for questions or concerns. Will see patient once per week then every 2 weeks when symptoms become mild/manageable, further spaced apart contingent upon improvement.     This chart note was generated using dictation software, and as such, there may be typographical errors present. Abbreviations: Cervical spine (CS), cervical-thoracic (CT), Dry needling (DN), Flexion adduction internal rotation (FAIR), high velocity, low amplitude (HVLA), Lumbar spine (LS), Soft tissue manipulation (STM), spinal manipulative therapy (SMT), Straight leg raise (SLR), Thoracic spine (TS).

## 2025-05-15 NOTE — ASSESSMENT & PLAN NOTE
Continue Rozerem  Orders:    ramelteon (Rozerem) 8 mg tablet; Take 1 tablet (8 mg) by mouth once daily at bedtime.

## 2025-05-15 NOTE — ASSESSMENT & PLAN NOTE
Suboptimal BP control, transition to irbesartan-HCTZ, with up-titration of dosing to achieve optimal BP control  Orders:    irbesartan-hydrochlorothiazide (Avalide) 150-12.5 mg tablet; Take 1 tablet by mouth once daily.    Basic metabolic panel; Future

## 2025-05-16 ENCOUNTER — APPOINTMENT (OUTPATIENT)
Dept: INTEGRATIVE MEDICINE | Facility: CLINIC | Age: 80
End: 2025-05-16
Payer: MEDICARE

## 2025-05-16 DIAGNOSIS — M54.50 CHRONIC BILATERAL LOW BACK PAIN WITHOUT SCIATICA: ICD-10-CM

## 2025-05-16 DIAGNOSIS — M99.03 SOMATIC DYSFUNCTION OF LUMBAR REGION: ICD-10-CM

## 2025-05-16 DIAGNOSIS — M99.05 SOMATIC DYSFUNCTION OF PELVIS REGION: ICD-10-CM

## 2025-05-16 DIAGNOSIS — G89.29 CHRONIC BILATERAL LOW BACK PAIN WITHOUT SCIATICA: ICD-10-CM

## 2025-05-16 DIAGNOSIS — M99.02 SOMATIC DYSFUNCTION OF THORACIC REGION: Primary | ICD-10-CM

## 2025-05-16 DIAGNOSIS — M47.816 LUMBAR SPONDYLOSIS: ICD-10-CM

## 2025-05-16 PROCEDURE — 98941 CHIROPRACT MANJ 3-4 REGIONS: CPT | Performed by: CHIROPRACTOR

## 2025-05-18 ASSESSMENT — ENCOUNTER SYMPTOMS
WEAKNESS: 0
NUMBNESS: 0
BACK PAIN: 1
UNEXPECTED WEIGHT CHANGE: 0
LIGHT-HEADEDNESS: 0
PALPITATIONS: 0
CHEST TIGHTNESS: 0
SHORTNESS OF BREATH: 0
COLOR CHANGE: 1
HEADACHES: 0
FATIGUE: 0
DIZZINESS: 0
ABDOMINAL PAIN: 0

## 2025-05-23 ENCOUNTER — APPOINTMENT (OUTPATIENT)
Dept: INTEGRATIVE MEDICINE | Facility: CLINIC | Age: 80
End: 2025-05-23
Payer: MEDICARE

## 2025-06-20 ENCOUNTER — APPOINTMENT (OUTPATIENT)
Dept: PHYSICAL THERAPY | Facility: CLINIC | Age: 80
End: 2025-06-20
Payer: MEDICARE

## 2025-06-27 ENCOUNTER — APPOINTMENT (OUTPATIENT)
Dept: PHYSICAL THERAPY | Facility: CLINIC | Age: 80
End: 2025-06-27
Payer: MEDICARE

## 2025-07-03 ENCOUNTER — APPOINTMENT (OUTPATIENT)
Dept: PHYSICAL THERAPY | Facility: CLINIC | Age: 80
End: 2025-07-03
Payer: MEDICARE

## 2025-07-09 ENCOUNTER — APPOINTMENT (OUTPATIENT)
Dept: PHYSICAL THERAPY | Facility: CLINIC | Age: 80
End: 2025-07-09
Payer: MEDICARE

## 2025-07-11 ENCOUNTER — APPOINTMENT (OUTPATIENT)
Dept: PHYSICAL THERAPY | Facility: CLINIC | Age: 80
End: 2025-07-11
Payer: MEDICARE

## 2025-07-16 ENCOUNTER — APPOINTMENT (OUTPATIENT)
Dept: PRIMARY CARE | Facility: CLINIC | Age: 80
End: 2025-07-16
Payer: MEDICARE

## 2025-08-27 ENCOUNTER — APPOINTMENT (OUTPATIENT)
Dept: PRIMARY CARE | Facility: CLINIC | Age: 80
End: 2025-08-27
Payer: MEDICARE

## 2025-08-27 VITALS
BODY MASS INDEX: 25.18 KG/M2 | HEIGHT: 69 IN | HEART RATE: 93 BPM | DIASTOLIC BLOOD PRESSURE: 87 MMHG | TEMPERATURE: 97.3 F | SYSTOLIC BLOOD PRESSURE: 137 MMHG | WEIGHT: 170 LBS

## 2025-08-27 DIAGNOSIS — Z23 NEED FOR INFLUENZA VACCINATION: ICD-10-CM

## 2025-08-27 DIAGNOSIS — R05.9 COUGH, UNSPECIFIED TYPE: ICD-10-CM

## 2025-08-27 DIAGNOSIS — I10 ESSENTIAL HYPERTENSION: Primary | ICD-10-CM

## 2025-08-27 DIAGNOSIS — R05.2 SUBACUTE COUGH: ICD-10-CM

## 2025-08-27 PROCEDURE — 1160F RVW MEDS BY RX/DR IN RCRD: CPT | Performed by: FAMILY MEDICINE

## 2025-08-27 PROCEDURE — 3079F DIAST BP 80-89 MM HG: CPT | Performed by: FAMILY MEDICINE

## 2025-08-27 PROCEDURE — 1159F MED LIST DOCD IN RCRD: CPT | Performed by: FAMILY MEDICINE

## 2025-08-27 PROCEDURE — 99213 OFFICE O/P EST LOW 20 MIN: CPT | Performed by: FAMILY MEDICINE

## 2025-08-27 PROCEDURE — 3075F SYST BP GE 130 - 139MM HG: CPT | Performed by: FAMILY MEDICINE

## 2025-08-27 PROCEDURE — 1036F TOBACCO NON-USER: CPT | Performed by: FAMILY MEDICINE

## 2025-08-27 PROCEDURE — G2211 COMPLEX E/M VISIT ADD ON: HCPCS | Performed by: FAMILY MEDICINE

## 2025-08-27 RX ORDER — IRBESARTAN AND HYDROCHLOROTHIAZIDE 300; 12.5 MG/1; MG/1
1 TABLET, FILM COATED ORAL DAILY
Qty: 90 TABLET | Refills: 1 | Status: SHIPPED | OUTPATIENT
Start: 2025-08-27 | End: 2026-08-27

## 2025-09-03 ENCOUNTER — TELEPHONE (OUTPATIENT)
Dept: PRIMARY CARE | Facility: CLINIC | Age: 80
End: 2025-09-03
Payer: MEDICARE

## 2025-09-03 DIAGNOSIS — I10 ESSENTIAL HYPERTENSION: Primary | ICD-10-CM

## 2025-09-03 RX ORDER — IRBESARTAN 300 MG/1
300 TABLET ORAL NIGHTLY
Qty: 30 TABLET | Refills: 1 | Status: SHIPPED | OUTPATIENT
Start: 2025-09-03 | End: 2026-09-03

## 2025-09-30 ENCOUNTER — APPOINTMENT (OUTPATIENT)
Dept: PRIMARY CARE | Facility: CLINIC | Age: 80
End: 2025-09-30
Payer: MEDICARE

## 2025-12-03 ENCOUNTER — APPOINTMENT (OUTPATIENT)
Dept: PRIMARY CARE | Facility: CLINIC | Age: 80
End: 2025-12-03
Payer: MEDICARE

## (undated) DEVICE — Device

## (undated) DEVICE — SUTURE, VICRYL, 0, 27 IN, UR-6, VIOLET

## (undated) DEVICE — SUTURE, MONOCRYL, 4-0, 27 IN, PS-2, UNDYED

## (undated) DEVICE — ABSORBATACK, 5MM SHORT, SINGLE USE/W 30 ABSORBABLE TACKS

## (undated) DEVICE — TROCAR SYSTEM, BALLOON, KII GELPORT, 12 X 100MM

## (undated) DEVICE — CLEANSER, SKIN, ANTIMICROBIAL, BACTOSHIELD CHG, 4%, 1 QT

## (undated) DEVICE — TROCAR, KII OPTICAL BLADELESS 5MM Z THREAD 100MM LNGTH

## (undated) DEVICE — PREP TRAY, VAGINAL

## (undated) DEVICE — TOWEL PACK, STERILE, 4/PACK, BLUE

## (undated) DEVICE — GLOVE, SURGICAL, PROTEXIS PI BLUE W/NEUTHERA, 7.0, PF, LF

## (undated) DEVICE — SCISSORS, METZ, CURVED, 3/4 BLADE